# Patient Record
Sex: FEMALE | Race: WHITE | HISPANIC OR LATINO | Employment: FULL TIME | URBAN - METROPOLITAN AREA
[De-identification: names, ages, dates, MRNs, and addresses within clinical notes are randomized per-mention and may not be internally consistent; named-entity substitution may affect disease eponyms.]

---

## 2021-04-16 ENCOUNTER — PATIENT MESSAGE (OUTPATIENT)
Dept: RESEARCH | Facility: HOSPITAL | Age: 31
End: 2021-04-16

## 2022-01-10 ENCOUNTER — OFFICE VISIT (OUTPATIENT)
Dept: OBSTETRICS AND GYNECOLOGY | Facility: CLINIC | Age: 32
End: 2022-01-10

## 2022-01-10 VITALS
WEIGHT: 178.38 LBS | DIASTOLIC BLOOD PRESSURE: 70 MMHG | SYSTOLIC BLOOD PRESSURE: 114 MMHG | HEIGHT: 65 IN | BODY MASS INDEX: 29.72 KG/M2

## 2022-01-10 DIAGNOSIS — N89.8 VAGINAL DISCHARGE: ICD-10-CM

## 2022-01-10 DIAGNOSIS — Z23 NEED FOR HPV VACCINATION: ICD-10-CM

## 2022-01-10 DIAGNOSIS — Z01.419 WELL WOMAN EXAM WITH ROUTINE GYNECOLOGICAL EXAM: Primary | ICD-10-CM

## 2022-01-10 DIAGNOSIS — Z31.69 ENCOUNTER FOR PRECONCEPTION CONSULTATION: ICD-10-CM

## 2022-01-10 PROCEDURE — 88175 CYTOPATH C/V AUTO FLUID REDO: CPT | Performed by: OBSTETRICS & GYNECOLOGY

## 2022-01-10 PROCEDURE — 99213 OFFICE O/P EST LOW 20 MIN: CPT | Mod: PBBFAC | Performed by: OBSTETRICS & GYNECOLOGY

## 2022-01-10 PROCEDURE — 99385 PREV VISIT NEW AGE 18-39: CPT | Mod: S$PBB,,, | Performed by: OBSTETRICS & GYNECOLOGY

## 2022-01-10 PROCEDURE — 99999 PR PBB SHADOW E&M-EST. PATIENT-LVL III: CPT | Mod: PBBFAC,,, | Performed by: OBSTETRICS & GYNECOLOGY

## 2022-01-10 PROCEDURE — 87481 CANDIDA DNA AMP PROBE: CPT | Mod: 59 | Performed by: OBSTETRICS & GYNECOLOGY

## 2022-01-10 PROCEDURE — 87801 DETECT AGNT MULT DNA AMPLI: CPT | Performed by: OBSTETRICS & GYNECOLOGY

## 2022-01-10 PROCEDURE — 99385 PR PREVENTIVE VISIT,NEW,18-39: ICD-10-PCS | Mod: S$PBB,,, | Performed by: OBSTETRICS & GYNECOLOGY

## 2022-01-10 PROCEDURE — 99999 PR PBB SHADOW E&M-EST. PATIENT-LVL III: ICD-10-PCS | Mod: PBBFAC,,, | Performed by: OBSTETRICS & GYNECOLOGY

## 2022-01-10 PROCEDURE — 87624 HPV HI-RISK TYP POOLED RSLT: CPT | Performed by: OBSTETRICS & GYNECOLOGY

## 2022-01-10 RX ORDER — FLUCONAZOLE 150 MG/1
150 TABLET ORAL ONCE
Qty: 1 TABLET | Refills: 0 | Status: SHIPPED | OUTPATIENT
Start: 2022-01-10 | End: 2022-01-10

## 2022-01-10 RX ORDER — ETONOGESTREL AND ETHINYL ESTRADIOL VAGINAL RING .015; .12 MG/D; MG/D
1 RING VAGINAL
Qty: 3 EACH | Refills: 3 | Status: SHIPPED | OUTPATIENT
Start: 2022-01-10 | End: 2024-02-07

## 2022-01-10 RX ORDER — METRONIDAZOLE 7.5 MG/G
1 GEL VAGINAL NIGHTLY
Qty: 70 G | Refills: 1 | Status: SHIPPED | OUTPATIENT
Start: 2022-01-10 | End: 2024-02-07

## 2022-01-10 NOTE — PROGRESS NOTES
Subjective:       Patient ID: Radha Callaway is a 31 y.o. female.    Chief Complaint:  Annual Exam      History of Present Illness  HPI   31 y.o. female  here for annual.      She describes her periods as regular, normal flow.  denies break through bleeding.   denies vaginal itching or irritation.  denies vaginal discharge. History of recurrent BV.     She is sexually active.  She uses no method for contraception.    History of abnormal pap: No. Patient is unsure if she had HPV vaccine.   Last Pap: ~4 years ago - normal per patient  Last MMG: N/A  Last Colonoscopy: N/A   Last DXA: N/A    History reviewed. No pertinent family history.    GYN & OB History  Patient's last menstrual period was 2021.   Date of Last Pap: 2022    OB History    Para Term  AB Living   2       1     SAB IAB Ectopic Multiple Live Births   1              # Outcome Date GA Lbr Olman/2nd Weight Sex Delivery Anes PTL Lv   2             1 SAB                Review of Systems  Review of Systems   Constitutional: Negative for chills, diaphoresis, fatigue and fever.   Respiratory: Negative for cough and shortness of breath.    Cardiovascular: Negative for chest pain and palpitations.   Gastrointestinal: Negative for abdominal pain, constipation, diarrhea, nausea and vomiting.   Genitourinary: Negative for dyspareunia, pelvic pain, vaginal bleeding, vaginal discharge and vaginal pain.   Integumentary:  Negative for breast mass, nipple discharge and breast skin changes.   Neurological: Negative for headaches.   Psychiatric/Behavioral: Negative for depression.   Breast: Negative for mass, mastodynia, nipple discharge and skin changes          Objective:    Physical Exam:   Constitutional: She is oriented to person, place, and time. She appears well-developed and well-nourished. No distress.    HENT:   Head: Normocephalic and atraumatic.    Eyes: EOM are normal.    Neck: No thyromegaly present.     Pulmonary/Chest:  Effort normal. She exhibits no mass and no tenderness. Right breast exhibits no inverted nipple, no mass, no nipple discharge, no skin change, no tenderness and no swelling. Left breast exhibits no inverted nipple, no mass, no nipple discharge, no skin change, no tenderness and no swelling. Breasts are symmetrical.        Abdominal: Soft. She exhibits no distension and no mass. There is no abdominal tenderness. There is no rebound and no guarding. No hernia.     Genitourinary:    Genitourinary Comments: Normal external female genitalia; vagina rugated, normal; cervix normal, no masses; uterus small mobile nontender; no adnexal masses palpated; rectal deferred               Musculoskeletal: Normal range of motion and moves all extremeties.       Neurological: She is alert and oriented to person, place, and time.    Skin: Skin is warm. No rash noted.    Psychiatric: She has a normal mood and affect. Her behavior is normal. Judgment and thought content normal.          Assessment:        1. Well woman exam with routine gynecological exam    2. Need for HPV vaccination    3. Encounter for preconception consultation    4. Vaginal discharge             Plan:      Diagnoses and all orders for this visit:    Well woman exam with routine gynecological exam  - Pap guidelines discussed. Pap/HPV collected. HPV vaccine scheduled.  - Breast cancer screening not yet indicated.   - Colon cancer screening not yet indicated.   - Bone density screening not yet indicated.  - Contraception - Rx Nuvaring.  - STD screening - declined.  - Suspected BV - Rx metrogel.    Need for HPV vaccination  -     (In Office Administered) HPV Vaccine (9-Valent) (3 Dose) (IM)    Encounter for preconception consultation    Referral to [facility] for egg cryo consultation.    Orders Placed This Encounter   Procedures    HPV High Risk Genotypes, PCR    Vaginosis Screen by DNA Probe    (In Office Administered) HPV Vaccine (9-Valent) (3 Dose) (IM)       No  follow-ups on file.

## 2022-01-10 NOTE — PATIENT INSTRUCTIONS
Female Infertility:  1. Are you ovulating?  - the first day of your cycle is Day #1. You will likely ovulate around CD14.  - Buy ovulation predictor kits: Day 10-Day 16 (Ovulate on average Day 14). If you get a positive, have intercourse that day and the next.  - Come to the lab 7 days after the peak and have bloodwork (progesterone level). Let Dr. Ivey know when you get a peak and we can schedule the lab.  - Consider a test called AMH - can be done at [facility] or can be done at [facility] (they sometimes offer a special that includes AMH and SA for $100).  - TSH, prolactin   - FSH, estradiol at the beginning of your cycle, let Dr. Ivey know when you start your menstrual cycle.      2. Tubal Factor Infertility   1. When the fallopian tubes are blocked  2. Causes: endometriosis or history of chlamydia  3. Hysterosalpingogram ([facility] - $550)     3. Male factor infertility  - Semen analysis ($150)  - [facility] ([address] across from [facility])      4. Structural causes  - Fibroid, Uterine Septum  - Order an ultrasound    5. Unexplained Infertility    Take prenatal vitamin daily - folic acid 400 mcg daily - OTC. Nature Made.     Genetic screening  Hemoglobin electrophoresis - test for sickle cell trait and thalassemias  CF mutation   SMA mutation

## 2022-01-14 LAB
FINAL PATHOLOGIC DIAGNOSIS: NORMAL
Lab: NORMAL

## 2022-01-16 LAB
BACTERIAL VAGINOSIS DNA: NEGATIVE
CANDIDA GLABRATA DNA: NEGATIVE
CANDIDA KRUSEI DNA: NEGATIVE
CANDIDA RRNA VAG QL PROBE: POSITIVE
T VAGINALIS RRNA GENITAL QL PROBE: NEGATIVE

## 2022-01-18 RX ORDER — FLUCONAZOLE 150 MG/1
150 TABLET ORAL ONCE
Qty: 1 TABLET | Refills: 0 | Status: SHIPPED | OUTPATIENT
Start: 2022-01-18 | End: 2022-01-18

## 2022-01-19 LAB
HPV HR 12 DNA SPEC QL NAA+PROBE: NEGATIVE
HPV16 AG SPEC QL: NEGATIVE
HPV18 DNA SPEC QL NAA+PROBE: NEGATIVE

## 2024-01-29 ENCOUNTER — TELEPHONE (OUTPATIENT)
Dept: OBSTETRICS AND GYNECOLOGY | Facility: CLINIC | Age: 34
End: 2024-01-29

## 2024-01-29 NOTE — TELEPHONE ENCOUNTER
----- Message from Josselin Christian sent at 1/29/2024  1:44 PM CST -----  Regarding: self [phone]  Type: Patient Call Back    Who called: self     What is the request in detail:  pt was seen by a Dr. Doll who only delivers at  provider recommended Dr. Peterson.     Can the clinic reply by [facility]? No     Would the patient rather a call back or a response via [facility]? Call back     Best call back number: [phone]

## 2024-02-07 ENCOUNTER — ROUTINE PRENATAL (OUTPATIENT)
Dept: OBSTETRICS AND GYNECOLOGY | Facility: CLINIC | Age: 34
End: 2024-02-07
Payer: MEDICAID

## 2024-02-07 VITALS
BODY MASS INDEX: 31 KG/M2 | WEIGHT: 186.31 LBS | DIASTOLIC BLOOD PRESSURE: 75 MMHG | HEART RATE: 101 BPM | SYSTOLIC BLOOD PRESSURE: 134 MMHG

## 2024-02-07 DIAGNOSIS — O26.892 VAGINAL DISCHARGE DURING PREGNANCY IN SECOND TRIMESTER: ICD-10-CM

## 2024-02-07 DIAGNOSIS — Z3A.22 22 WEEKS GESTATION OF PREGNANCY: ICD-10-CM

## 2024-02-07 DIAGNOSIS — Z34.82 ENCOUNTER FOR SUPERVISION OF OTHER NORMAL PREGNANCY IN SECOND TRIMESTER: Primary | ICD-10-CM

## 2024-02-07 DIAGNOSIS — Z15.89 MTHFR GENE MUTATION: ICD-10-CM

## 2024-02-07 DIAGNOSIS — N89.8 VAGINAL DISCHARGE DURING PREGNANCY IN SECOND TRIMESTER: ICD-10-CM

## 2024-02-07 PROCEDURE — 81514 NFCT DS BV&VAGINITIS DNA ALG: CPT | Performed by: STUDENT IN AN ORGANIZED HEALTH CARE EDUCATION/TRAINING PROGRAM

## 2024-02-07 PROCEDURE — 99999 PR PBB SHADOW E&M-EST. PATIENT-LVL III: CPT | Mod: PBBFAC,,, | Performed by: STUDENT IN AN ORGANIZED HEALTH CARE EDUCATION/TRAINING PROGRAM

## 2024-02-07 PROCEDURE — 87491 CHLMYD TRACH DNA AMP PROBE: CPT | Performed by: STUDENT IN AN ORGANIZED HEALTH CARE EDUCATION/TRAINING PROGRAM

## 2024-02-07 PROCEDURE — 99213 OFFICE O/P EST LOW 20 MIN: CPT | Mod: PBBFAC,TH | Performed by: STUDENT IN AN ORGANIZED HEALTH CARE EDUCATION/TRAINING PROGRAM

## 2024-02-07 PROCEDURE — 87086 URINE CULTURE/COLONY COUNT: CPT | Performed by: STUDENT IN AN ORGANIZED HEALTH CARE EDUCATION/TRAINING PROGRAM

## 2024-02-07 PROCEDURE — 99214 OFFICE O/P EST MOD 30 MIN: CPT | Mod: TH,S$PBB,, | Performed by: STUDENT IN AN ORGANIZED HEALTH CARE EDUCATION/TRAINING PROGRAM

## 2024-02-07 RX ORDER — ALCOHOL 2.38 KG/3.79L
1 GEL TOPICAL DAILY
COMMUNITY
Start: 2023-12-07 | End: 2024-06-18

## 2024-02-07 RX ORDER — VITAMINS AND MINERALS 1; 1000; 100; 400; 30; 3; 3; 15; 20; 12; 7; 200; 29; 20 MG/1; [IU]/1; MG/1; [IU]/1; [IU]/1; MG/1; MG/1; MG/1; MG/1; UG/1; MG/1; MG/1; MG/1; MG/1
1 TABLET, CHEWABLE ORAL
COMMUNITY
Start: 2023-10-12

## 2024-02-07 RX ORDER — MICONAZOLE NITRATE 2 %
CREAM WITH APPLICATOR VAGINAL
COMMUNITY
Start: 2023-10-12 | End: 2024-06-05

## 2024-02-07 RX ORDER — FOLIC ACID 0.4 MG
1 TABLET ORAL DAILY
COMMUNITY
End: 2024-02-07

## 2024-02-07 RX ORDER — NAPROXEN SODIUM 220 MG/1
81 TABLET, FILM COATED ORAL DAILY
Status: ON HOLD | COMMUNITY
End: 2024-06-14 | Stop reason: HOSPADM

## 2024-02-09 LAB
BACTERIA UR CULT: NORMAL
BACTERIAL VAGINOSIS DNA: NEGATIVE
CANDIDA GLABRATA DNA: NEGATIVE
CANDIDA KRUSEI DNA: NEGATIVE
CANDIDA RRNA VAG QL PROBE: NEGATIVE
T VAGINALIS RRNA GENITAL QL PROBE: NEGATIVE

## 2024-02-15 LAB
C TRACH DNA SPEC QL NAA+PROBE: NOT DETECTED
N GONORRHOEA DNA SPEC QL NAA+PROBE: NOT DETECTED

## 2024-02-21 ENCOUNTER — PATIENT MESSAGE (OUTPATIENT)
Dept: OTHER | Facility: OTHER | Age: 34
End: 2024-02-21

## 2024-03-04 ENCOUNTER — TELEPHONE (OUTPATIENT)
Dept: OBSTETRICS AND GYNECOLOGY | Facility: CLINIC | Age: 34
End: 2024-03-04

## 2024-03-04 PROBLEM — Z15.89 MTHFR GENE MUTATION: Status: ACTIVE | Noted: 2024-03-04

## 2024-03-04 NOTE — TELEPHONE ENCOUNTER
Called to check in. Confirmed identity. Pt very anxious that she recognized initial OB labs were never performed at prior OB office. We discussed we can add these on to her 1 hr GTT blood draw at visit on Monday. She feels much better knowing this plan. No further questions.

## 2024-03-05 NOTE — PROGRESS NOTES
22w0d by 13w6d US. Initial OB to me, transfer from [facility].   This pregnancy: H/o subchorionic hematoma 1st tri, no bleeding since. NIPT negative. Seeing MFM for renal pyelectasis, otherwise normal anatomy.    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   PMH: h/o exercise induced asthma, h/o depression, +MTFR gene. Meds: folate, PNV.    Otherwise doing well today with no complaints. Recently treated for yeast infection and requests vaginal swab to ensure it has cleared.   +FM. Denies abdominal pain, vaginal discharge, vaginal bleeding, dysuria, headaches.  1 hr GTT/CBC next visit. Reports she has f/u with Dr Wesley BABIN at [facility].   Pain, bleeding, fever precautions provided. RTC 4 wks or sooner if needed.      I spent a total of 30 minutes on the day of the visit. This includes face to face time and non-face to face time preparing to see the patient (eg, review of tests with interpretation of results and explanation of results to patient), obtaining and/or reviewing separately obtained history, documenting clinical information, appropriate prenatal counseling, and care coordination.

## 2024-03-06 ENCOUNTER — PATIENT MESSAGE (OUTPATIENT)
Dept: OTHER | Facility: OTHER | Age: 34
End: 2024-03-06

## 2024-03-12 ENCOUNTER — ROUTINE PRENATAL (OUTPATIENT)
Dept: OBSTETRICS AND GYNECOLOGY | Facility: CLINIC | Age: 34
End: 2024-03-12
Payer: MEDICAID

## 2024-03-12 ENCOUNTER — LAB VISIT (OUTPATIENT)
Dept: LAB | Facility: HOSPITAL | Age: 34
End: 2024-03-12
Attending: STUDENT IN AN ORGANIZED HEALTH CARE EDUCATION/TRAINING PROGRAM
Payer: MEDICAID

## 2024-03-12 VITALS — BODY MASS INDEX: 32.5 KG/M2 | WEIGHT: 195.31 LBS | SYSTOLIC BLOOD PRESSURE: 118 MMHG | DIASTOLIC BLOOD PRESSURE: 80 MMHG

## 2024-03-12 DIAGNOSIS — Z3A.22 22 WEEKS GESTATION OF PREGNANCY: ICD-10-CM

## 2024-03-12 DIAGNOSIS — Z34.02 ENCOUNTER FOR SUPERVISION OF NORMAL FIRST PREGNANCY IN SECOND TRIMESTER: Primary | ICD-10-CM

## 2024-03-12 DIAGNOSIS — Z3A.26 26 WEEKS GESTATION OF PREGNANCY: ICD-10-CM

## 2024-03-12 LAB
ABO + RH BLD: NORMAL
ALBUMIN SERPL BCP-MCNC: 2.5 G/DL (ref 3.5–5.2)
ALP SERPL-CCNC: 90 U/L (ref 55–135)
ALT SERPL W/O P-5'-P-CCNC: 23 U/L (ref 10–44)
ANION GAP SERPL CALC-SCNC: 6 MMOL/L (ref 8–16)
AST SERPL-CCNC: 16 U/L (ref 10–40)
BASOPHILS # BLD AUTO: 0.03 K/UL (ref 0–0.2)
BASOPHILS NFR BLD: 0.3 % (ref 0–1.9)
BILIRUB SERPL-MCNC: 0.1 MG/DL (ref 0.1–1)
BLD GP AB SCN CELLS X3 SERPL QL: NORMAL
BUN SERPL-MCNC: 9 MG/DL (ref 6–20)
CALCIUM SERPL-MCNC: 8.6 MG/DL (ref 8.7–10.5)
CHLORIDE SERPL-SCNC: 107 MMOL/L (ref 95–110)
CO2 SERPL-SCNC: 22 MMOL/L (ref 23–29)
CREAT SERPL-MCNC: 0.6 MG/DL (ref 0.5–1.4)
DIFFERENTIAL METHOD BLD: ABNORMAL
EOSINOPHIL # BLD AUTO: 0.1 K/UL (ref 0–0.5)
EOSINOPHIL NFR BLD: 0.7 % (ref 0–8)
ERYTHROCYTE [DISTWIDTH] IN BLOOD BY AUTOMATED COUNT: 13.5 % (ref 11.5–14.5)
EST. GFR  (NO RACE VARIABLE): >60 ML/MIN/1.73 M^2
GLUCOSE SERPL-MCNC: 81 MG/DL (ref 70–110)
GLUCOSE SERPL-MCNC: 84 MG/DL (ref 70–140)
HBV SURFACE AG SERPL QL IA: NORMAL
HCT VFR BLD AUTO: 35.6 % (ref 37–48.5)
HGB BLD-MCNC: 11.8 G/DL (ref 12–16)
HIV 1+2 AB+HIV1 P24 AG SERPL QL IA: NORMAL
IMM GRANULOCYTES # BLD AUTO: 0.07 K/UL (ref 0–0.04)
IMM GRANULOCYTES NFR BLD AUTO: 0.7 % (ref 0–0.5)
LYMPHOCYTES # BLD AUTO: 1.4 K/UL (ref 1–4.8)
LYMPHOCYTES NFR BLD: 15.1 % (ref 18–48)
MCH RBC QN AUTO: 31.3 PG (ref 27–31)
MCHC RBC AUTO-ENTMCNC: 33.1 G/DL (ref 32–36)
MCV RBC AUTO: 94 FL (ref 82–98)
MONOCYTES # BLD AUTO: 1 K/UL (ref 0.3–1)
MONOCYTES NFR BLD: 10.3 % (ref 4–15)
NEUTROPHILS # BLD AUTO: 6.9 K/UL (ref 1.8–7.7)
NEUTROPHILS NFR BLD: 72.9 % (ref 38–73)
NRBC BLD-RTO: 0 /100 WBC
PLATELET # BLD AUTO: 232 K/UL (ref 150–450)
PMV BLD AUTO: 11.8 FL (ref 9.2–12.9)
POTASSIUM SERPL-SCNC: 3.6 MMOL/L (ref 3.5–5.1)
PROT SERPL-MCNC: 5.9 G/DL (ref 6–8.4)
RBC # BLD AUTO: 3.77 M/UL (ref 4–5.4)
SODIUM SERPL-SCNC: 135 MMOL/L (ref 136–145)
SPECIMEN OUTDATE: NORMAL
WBC # BLD AUTO: 9.53 K/UL (ref 3.9–12.7)

## 2024-03-12 PROCEDURE — 86592 SYPHILIS TEST NON-TREP QUAL: CPT | Performed by: STUDENT IN AN ORGANIZED HEALTH CARE EDUCATION/TRAINING PROGRAM

## 2024-03-12 PROCEDURE — 87389 HIV-1 AG W/HIV-1&-2 AB AG IA: CPT | Performed by: STUDENT IN AN ORGANIZED HEALTH CARE EDUCATION/TRAINING PROGRAM

## 2024-03-12 PROCEDURE — 80053 COMPREHEN METABOLIC PANEL: CPT | Performed by: STUDENT IN AN ORGANIZED HEALTH CARE EDUCATION/TRAINING PROGRAM

## 2024-03-12 PROCEDURE — 86901 BLOOD TYPING SEROLOGIC RH(D): CPT | Performed by: STUDENT IN AN ORGANIZED HEALTH CARE EDUCATION/TRAINING PROGRAM

## 2024-03-12 PROCEDURE — 87340 HEPATITIS B SURFACE AG IA: CPT | Performed by: STUDENT IN AN ORGANIZED HEALTH CARE EDUCATION/TRAINING PROGRAM

## 2024-03-12 PROCEDURE — 99213 OFFICE O/P EST LOW 20 MIN: CPT | Mod: TH,S$PBB,, | Performed by: STUDENT IN AN ORGANIZED HEALTH CARE EDUCATION/TRAINING PROGRAM

## 2024-03-12 PROCEDURE — 99212 OFFICE O/P EST SF 10 MIN: CPT | Mod: PBBFAC,25,TH | Performed by: STUDENT IN AN ORGANIZED HEALTH CARE EDUCATION/TRAINING PROGRAM

## 2024-03-12 PROCEDURE — 82950 GLUCOSE TEST: CPT | Performed by: STUDENT IN AN ORGANIZED HEALTH CARE EDUCATION/TRAINING PROGRAM

## 2024-03-12 PROCEDURE — 85025 COMPLETE CBC W/AUTO DIFF WBC: CPT | Performed by: STUDENT IN AN ORGANIZED HEALTH CARE EDUCATION/TRAINING PROGRAM

## 2024-03-12 PROCEDURE — 36415 COLL VENOUS BLD VENIPUNCTURE: CPT | Performed by: STUDENT IN AN ORGANIZED HEALTH CARE EDUCATION/TRAINING PROGRAM

## 2024-03-12 PROCEDURE — 99999 PR PBB SHADOW E&M-EST. PATIENT-LVL II: CPT | Mod: PBBFAC,,, | Performed by: STUDENT IN AN ORGANIZED HEALTH CARE EDUCATION/TRAINING PROGRAM

## 2024-03-12 NOTE — LETTER
March 12, 2024    Radha Callaway  [address] [place] [id number]         [facility]  [address] [facility] [address]  Phone: [phone] March 12, 2024     Patient: Radha Callaway   YOB: 1990   Date of Visit: 3/12/2024       To Whom It May Concern:    Radha Callaway is under the care of Dr.Lillian Peterson with [facility] for the duration of her pregnancy. Her estimated due date is June 12th, 2024 and is not recommended that she travel the month prior to delivery. Chrissy Callaway and Jesika Vitor are family members of this patient.     If you have any questions or concerns, please don't hesitate to call.    Sincerely,        Marissa Peterson MD

## 2024-03-13 LAB — RPR SER QL: NORMAL

## 2024-03-14 ENCOUNTER — TELEPHONE (OUTPATIENT)
Dept: OBSTETRICS AND GYNECOLOGY | Facility: CLINIC | Age: 34
End: 2024-03-14

## 2024-03-14 NOTE — TELEPHONE ENCOUNTER
Called to answer questions. She reports cough and wondering safe medications - recommend mucinex and claritin. Other questions answered, as well. Also informed of normal labs results. She voiced understanding.

## 2024-03-20 ENCOUNTER — PATIENT MESSAGE (OUTPATIENT)
Dept: OTHER | Facility: OTHER | Age: 34
End: 2024-03-20

## 2024-04-02 ENCOUNTER — TELEPHONE (OUTPATIENT)
Dept: OBSTETRICS AND GYNECOLOGY | Facility: CLINIC | Age: 34
End: 2024-04-02
Payer: MEDICAID

## 2024-04-02 NOTE — TELEPHONE ENCOUNTER
Nicholas pt states she has been suffering with congestion for about 3wks now pt is 29wks ob pt states she has taken OCT meds and mist nothing is helping pt would like a call to discuss     4/2/24 @ 1501 pt states she has bad congestion for over a week, taking medication using a nasal spray.   Pt instructed to follow up with [facility] or go the urgent care. Hydration and rest. Ok to continue sudafed and nasal spray. Pt verbalizes understanding.

## 2024-04-03 ENCOUNTER — PATIENT MESSAGE (OUTPATIENT)
Dept: OTHER | Facility: OTHER | Age: 34
End: 2024-04-03
Payer: MEDICAID

## 2024-04-09 ENCOUNTER — TELEPHONE (OUTPATIENT)
Dept: OBSTETRICS AND GYNECOLOGY | Facility: CLINIC | Age: 34
End: 2024-04-09
Payer: MEDICAID

## 2024-04-09 ENCOUNTER — TELEPHONE (OUTPATIENT)
Dept: OBSTETRICS AND GYNECOLOGY | Facility: CLINIC | Age: 34
End: 2024-04-09

## 2024-04-09 NOTE — LETTER
April 9, 2024    Radha Callaway  [address] [place] [id number]              [facility]  [address] [facility] [address]  Phone: [phone]    To Whom It May Concern:    Case # [id number]     Ms. Radha Callaway is currently under our care for pregnancy. Her estimated date of delivery is June 12th, 2024.    Sincerely,    Marissa Peterson MD

## 2024-04-09 NOTE — TELEPHONE ENCOUNTER
Returning pt call regarding confirmation letter and MD notes. Pt stating that information needs to be sent to insurance because they are saying she has not been seen since March. Informing pt that letter is sent to portal, will send MD notes. Pt voices understanding. E-mail confirmed. No further questions/ concerns.

## 2024-04-09 NOTE — PROGRESS NOTES
26w6d  Doing well today with no complaints.  Excellent FM. Denies vaginal bleeding, discharge, contractions, LOF.  1 hr GTT/CBC today. Will add-on HepCAb which was not collected w/initial labs. Tdap next visit.   Has f/u at [facility]  for renal pyelectasis.  Labor, preE, FMC precautions provided. RTC 4 wk or earlier if needed.    I spent a total of 20 minutes on the day of the visit. This includes face to face time and non-face to face time preparing to see the patient (eg, review of tests with interpretation of results and explanation of results to patient), obtaining and/or reviewing separately obtained history, documenting clinical information, appropriate prenatal counseling, and care coordination.

## 2024-04-17 ENCOUNTER — PATIENT MESSAGE (OUTPATIENT)
Dept: OTHER | Facility: OTHER | Age: 34
End: 2024-04-17
Payer: MEDICAID

## 2024-04-18 ENCOUNTER — ROUTINE PRENATAL (OUTPATIENT)
Dept: OBSTETRICS AND GYNECOLOGY | Facility: CLINIC | Age: 34
End: 2024-04-18
Attending: STUDENT IN AN ORGANIZED HEALTH CARE EDUCATION/TRAINING PROGRAM
Payer: MEDICAID

## 2024-04-18 VITALS
SYSTOLIC BLOOD PRESSURE: 118 MMHG | DIASTOLIC BLOOD PRESSURE: 68 MMHG | BODY MASS INDEX: 33.73 KG/M2 | WEIGHT: 202.69 LBS

## 2024-04-18 DIAGNOSIS — Z3A.32 32 WEEKS GESTATION OF PREGNANCY: Primary | ICD-10-CM

## 2024-04-18 PROCEDURE — 99212 OFFICE O/P EST SF 10 MIN: CPT | Mod: PBBFAC | Performed by: NURSE PRACTITIONER

## 2024-04-18 PROCEDURE — 99213 OFFICE O/P EST LOW 20 MIN: CPT | Mod: TH,S$PBB,, | Performed by: NURSE PRACTITIONER

## 2024-04-18 PROCEDURE — 99999 PR PBB SHADOW E&M-EST. PATIENT-LVL II: CPT | Mod: PBBFAC,,, | Performed by: NURSE PRACTITIONER

## 2024-04-18 RX ORDER — B-COMPLEX WITH VITAMIN C
1 TABLET ORAL DAILY
COMMUNITY

## 2024-04-18 NOTE — PROGRESS NOTES
Patient with no complaints. Denies vaginal bleeding or contractions. Good FM. RTC in 2 weeks.     Vitals signs, FHTs, urine dip, and PE findings documented, reviewed and available in OB flow chart.       I spent a total of 20 minutes on the day of the visit.This includes face to face time and non-face to face time preparing to see the patient (eg, review of tests), Obtaining and/or reviewing separately obtained history, Documenting clinical information in the electronic or other health record, Independently interpreting resultsand communicating results to the patient/family/caregiver, or Care coordination.     Coffective counseling sheet Build Your Team discussed with mother. Reinforced importance of early identification of support team including champion, OB provider, pediatrician and local community resources. Encouraged mother to download Coffective mobile raffy if she has not already done so.  Mother verbalizes understanding. Also discussed quiet time and delayed bathing.

## 2024-04-29 ENCOUNTER — TELEPHONE (OUTPATIENT)
Dept: OBSTETRICS AND GYNECOLOGY | Facility: CLINIC | Age: 34
End: 2024-04-29
Payer: MEDICAID

## 2024-04-29 NOTE — TELEPHONE ENCOUNTER
Dr Peterson pt calling ob 33wks wants to know if she go on a trip two hours away on May 17-19 and due on 6-12, would like to discuss. Pt # [phone]     4/29/24 @ 8973 .Returned pt's call. N/A LMTRC @ [phone]. Will also send portal message.

## 2024-05-01 ENCOUNTER — ROUTINE PRENATAL (OUTPATIENT)
Dept: OBSTETRICS AND GYNECOLOGY | Facility: CLINIC | Age: 34
End: 2024-05-01
Payer: MEDICAID

## 2024-05-01 VITALS
DIASTOLIC BLOOD PRESSURE: 76 MMHG | BODY MASS INDEX: 34.05 KG/M2 | WEIGHT: 204.56 LBS | SYSTOLIC BLOOD PRESSURE: 129 MMHG

## 2024-05-01 DIAGNOSIS — N96 HISTORY OF MULTIPLE MISCARRIAGES: ICD-10-CM

## 2024-05-01 DIAGNOSIS — Z3A.34 34 WEEKS GESTATION OF PREGNANCY: ICD-10-CM

## 2024-05-01 DIAGNOSIS — Z34.03 ENCOUNTER FOR SUPERVISION OF NORMAL FIRST PREGNANCY, THIRD TRIMESTER: Primary | ICD-10-CM

## 2024-05-01 PROCEDURE — 99999 PR PBB SHADOW E&M-EST. PATIENT-LVL III: CPT | Mod: PBBFAC,,, | Performed by: STUDENT IN AN ORGANIZED HEALTH CARE EDUCATION/TRAINING PROGRAM

## 2024-05-01 PROCEDURE — 99213 OFFICE O/P EST LOW 20 MIN: CPT | Mod: TH,S$PBB,, | Performed by: STUDENT IN AN ORGANIZED HEALTH CARE EDUCATION/TRAINING PROGRAM

## 2024-05-01 PROCEDURE — 99213 OFFICE O/P EST LOW 20 MIN: CPT | Mod: PBBFAC,TH | Performed by: STUDENT IN AN ORGANIZED HEALTH CARE EDUCATION/TRAINING PROGRAM

## 2024-05-01 RX ORDER — TERCONAZOLE 4 MG/G
CREAM VAGINAL
COMMUNITY
Start: 2024-04-25 | End: 2024-05-02

## 2024-05-01 RX ORDER — FOLIC ACID 0.4 MG
2 TABLET ORAL DAILY
COMMUNITY
Start: 2024-04-25 | End: 2024-05-25

## 2024-05-01 NOTE — LETTER
May 1, 2024    Radha Callaway  [address]  [place] [address]              [facility]  [address] [facility]  [place] [address]  Phone: [phone]    To Whom It May Concern:    Ms. Radha Callaway was seen in our office on Wednesday, May 1st, 2024. She is currently under our care for pregnancy. Her estimated delivery date is June 12th, 2024.    It is my medical opinion that she avoid travel a month prior to delivery.     Please let me know if you have any questions, concerns, or if I can be of further assistance to you.    Sincerely,    Marissa Peterson MD

## 2024-05-01 NOTE — PROGRESS NOTES
34w0d  Doing well today with no complaints. Pain in her joints, worse in her hands, has tried carpal tunnel braces with not much improvement.  Excellent FM. Denies vaginal bleeding, discharge, contractions, LOF.  Reviewed [facility] MFM note - recommends not to go past 40 wks and renal pyelectasis resolved. Pt anxious about an induction if spontaneous labor does not occur, answered questions today, will continue to discuss each visit.  Tdap in pharmacy today. Consents/GBS/labs next visit.   Labor, preE, FMC precautions provided. RTC 2 wk or earlier if needed.    I spent a total of 20 minutes on the day of the visit. This includes face to face time and non-face to face time preparing to see the patient (eg, review of tests with interpretation of results and explanation of results to patient), obtaining and/or reviewing separately obtained history, documenting clinical information, appropriate prenatal counseling, and care coordination.

## 2024-05-08 ENCOUNTER — TELEPHONE (OUTPATIENT)
Dept: OBSTETRICS AND GYNECOLOGY | Facility: CLINIC | Age: 34
End: 2024-05-08
Payer: MEDICAID

## 2024-05-08 ENCOUNTER — PATIENT MESSAGE (OUTPATIENT)
Dept: OTHER | Facility: OTHER | Age: 34
End: 2024-05-08
Payer: MEDICAID

## 2024-05-08 NOTE — TELEPHONE ENCOUNTER
Pt called in give update on Urgent care visit, pt is negative for covid, flu and strep. Pt was giving medications but was told to contact Md to see if she can take them,   1. Alahist P.E 2.75ml 3xs a day   2. Zpack 250mg   3.Tessalon 200mg 3x a day   4. Prednisone 20mg tab for 3 days   5.phenergen DM 6.25-15mg/5ml     Pt states she went to the urgent  on sudafed and mucinex  Will let Dr Peterson know  Pt KASIA       Spoke with Dr Nicholas garcia to take meds with Tessalon use sparingly. Replace sudafed and mucines with alahist. Pt KASIA

## 2024-05-08 NOTE — TELEPHONE ENCOUNTER
Nicholas pt calling, ob 35wks,  states she is sick. Would like to discuss coming in or going to urgent care #[phone]    5/8/24 @ 1139 Woke up with very sore throat, Mucus, Pt instructed to go to the urgent care for evaluation, swabs etc. Will send the approved list of medications over the counter to treat symptoms, hydrate, rest and let us know what they say in the urgent.care. Pt KASIA

## 2024-05-09 ENCOUNTER — PATIENT MESSAGE (OUTPATIENT)
Dept: OBSTETRICS AND GYNECOLOGY | Facility: CLINIC | Age: 34
End: 2024-05-09
Payer: MEDICAID

## 2024-05-15 ENCOUNTER — LAB VISIT (OUTPATIENT)
Dept: LAB | Facility: HOSPITAL | Age: 34
End: 2024-05-15
Attending: STUDENT IN AN ORGANIZED HEALTH CARE EDUCATION/TRAINING PROGRAM
Payer: MEDICAID

## 2024-05-15 ENCOUNTER — ROUTINE PRENATAL (OUTPATIENT)
Dept: OBSTETRICS AND GYNECOLOGY | Facility: CLINIC | Age: 34
End: 2024-05-15
Payer: MEDICAID

## 2024-05-15 VITALS
WEIGHT: 211.88 LBS | BODY MASS INDEX: 35.26 KG/M2 | DIASTOLIC BLOOD PRESSURE: 83 MMHG | SYSTOLIC BLOOD PRESSURE: 126 MMHG

## 2024-05-15 DIAGNOSIS — N96 HISTORY OF MULTIPLE MISCARRIAGES: ICD-10-CM

## 2024-05-15 DIAGNOSIS — Z3A.36 36 WEEKS GESTATION OF PREGNANCY: ICD-10-CM

## 2024-05-15 DIAGNOSIS — Z34.03 ENCOUNTER FOR SUPERVISION OF NORMAL FIRST PREGNANCY, THIRD TRIMESTER: Primary | ICD-10-CM

## 2024-05-15 LAB
BASOPHILS # BLD AUTO: 0.02 K/UL (ref 0–0.2)
BASOPHILS NFR BLD: 0.2 % (ref 0–1.9)
DIFFERENTIAL METHOD BLD: ABNORMAL
EOSINOPHIL # BLD AUTO: 0.1 K/UL (ref 0–0.5)
EOSINOPHIL NFR BLD: 0.8 % (ref 0–8)
ERYTHROCYTE [DISTWIDTH] IN BLOOD BY AUTOMATED COUNT: 13.3 % (ref 11.5–14.5)
HCT VFR BLD AUTO: 36.7 % (ref 37–48.5)
HGB BLD-MCNC: 12.3 G/DL (ref 12–16)
HIV 1+2 AB+HIV1 P24 AG SERPL QL IA: NORMAL
IMM GRANULOCYTES # BLD AUTO: 0.07 K/UL (ref 0–0.04)
IMM GRANULOCYTES NFR BLD AUTO: 0.7 % (ref 0–0.5)
LYMPHOCYTES # BLD AUTO: 1.8 K/UL (ref 1–4.8)
LYMPHOCYTES NFR BLD: 18 % (ref 18–48)
MCH RBC QN AUTO: 31.5 PG (ref 27–31)
MCHC RBC AUTO-ENTMCNC: 33.5 G/DL (ref 32–36)
MCV RBC AUTO: 94 FL (ref 82–98)
MONOCYTES # BLD AUTO: 0.8 K/UL (ref 0.3–1)
MONOCYTES NFR BLD: 7.6 % (ref 4–15)
NEUTROPHILS # BLD AUTO: 7.3 K/UL (ref 1.8–7.7)
NEUTROPHILS NFR BLD: 72.7 % (ref 38–73)
NRBC BLD-RTO: 0 /100 WBC
PLATELET # BLD AUTO: 318 K/UL (ref 150–450)
PMV BLD AUTO: 12.1 FL (ref 9.2–12.9)
RBC # BLD AUTO: 3.9 M/UL (ref 4–5.4)
TREPONEMA PALLIDUM IGG+IGM AB [PRESENCE] IN SERUM OR PLASMA BY IMMUNOASSAY: NONREACTIVE
WBC # BLD AUTO: 10.08 K/UL (ref 3.9–12.7)

## 2024-05-15 PROCEDURE — 99212 OFFICE O/P EST SF 10 MIN: CPT | Mod: PBBFAC,TH | Performed by: STUDENT IN AN ORGANIZED HEALTH CARE EDUCATION/TRAINING PROGRAM

## 2024-05-15 PROCEDURE — 85025 COMPLETE CBC W/AUTO DIFF WBC: CPT | Performed by: STUDENT IN AN ORGANIZED HEALTH CARE EDUCATION/TRAINING PROGRAM

## 2024-05-15 PROCEDURE — 99213 OFFICE O/P EST LOW 20 MIN: CPT | Mod: TH,S$PBB,, | Performed by: STUDENT IN AN ORGANIZED HEALTH CARE EDUCATION/TRAINING PROGRAM

## 2024-05-15 PROCEDURE — 99999 PR PBB SHADOW E&M-EST. PATIENT-LVL II: CPT | Mod: PBBFAC,,, | Performed by: STUDENT IN AN ORGANIZED HEALTH CARE EDUCATION/TRAINING PROGRAM

## 2024-05-15 PROCEDURE — 87389 HIV-1 AG W/HIV-1&-2 AB AG IA: CPT | Performed by: STUDENT IN AN ORGANIZED HEALTH CARE EDUCATION/TRAINING PROGRAM

## 2024-05-15 PROCEDURE — 86593 SYPHILIS TEST NON-TREP QUANT: CPT | Performed by: STUDENT IN AN ORGANIZED HEALTH CARE EDUCATION/TRAINING PROGRAM

## 2024-05-15 PROCEDURE — 87081 CULTURE SCREEN ONLY: CPT | Performed by: STUDENT IN AN ORGANIZED HEALTH CARE EDUCATION/TRAINING PROGRAM

## 2024-05-15 PROCEDURE — 36415 COLL VENOUS BLD VENIPUNCTURE: CPT | Performed by: STUDENT IN AN ORGANIZED HEALTH CARE EDUCATION/TRAINING PROGRAM

## 2024-05-15 NOTE — PROGRESS NOTES
36w0d  Doing well today with no complaints.  Excellent FM. Denies vaginal bleeding, discharge, contractions, LOF.  R/B/A of vaginal, operative, and  delivery as well as transfusion of blood products discussed today. All questions answered and patient voices understanding. Consents signed.   GBS, HIV, TrepIg, CBC collected.   Looking for a pediatrician, thinking about POP vs Nexplanon for PPC.  Labor, preE, FMC precautions provided. RTC 1 wk or earlier if needed.    I spent a total of 20 minutes on the day of the visit. This includes face to face time and non-face to face time preparing to see the patient (eg, review of tests with interpretation of results and explanation of results to patient), obtaining and/or reviewing separately obtained history, documenting clinical information, appropriate prenatal counseling, and care coordination.

## 2024-05-17 LAB — BACTERIA SPEC AEROBE CULT: NORMAL

## 2024-05-22 ENCOUNTER — ROUTINE PRENATAL (OUTPATIENT)
Dept: OBSTETRICS AND GYNECOLOGY | Facility: CLINIC | Age: 34
End: 2024-05-22
Payer: MEDICAID

## 2024-05-22 ENCOUNTER — TELEPHONE (OUTPATIENT)
Dept: OBSTETRICS AND GYNECOLOGY | Facility: HOSPITAL | Age: 34
End: 2024-05-22
Payer: MEDICAID

## 2024-05-22 VITALS
WEIGHT: 211.63 LBS | DIASTOLIC BLOOD PRESSURE: 82 MMHG | BODY MASS INDEX: 35.22 KG/M2 | SYSTOLIC BLOOD PRESSURE: 122 MMHG

## 2024-05-22 DIAGNOSIS — N96 HISTORY OF MULTIPLE MISCARRIAGES: ICD-10-CM

## 2024-05-22 DIAGNOSIS — Z15.89 MTHFR GENE MUTATION: Primary | ICD-10-CM

## 2024-05-22 DIAGNOSIS — N89.8 VAGINAL DISCHARGE: ICD-10-CM

## 2024-05-22 DIAGNOSIS — Z34.03 ENCOUNTER FOR SUPERVISION OF NORMAL FIRST PREGNANCY, THIRD TRIMESTER: Primary | ICD-10-CM

## 2024-05-22 DIAGNOSIS — Z3A.37 37 WEEKS GESTATION OF PREGNANCY: ICD-10-CM

## 2024-05-22 PROCEDURE — 99999 PR PBB SHADOW E&M-EST. PATIENT-LVL III: CPT | Mod: PBBFAC,,, | Performed by: STUDENT IN AN ORGANIZED HEALTH CARE EDUCATION/TRAINING PROGRAM

## 2024-05-22 PROCEDURE — 99213 OFFICE O/P EST LOW 20 MIN: CPT | Mod: TH,S$PBB,, | Performed by: STUDENT IN AN ORGANIZED HEALTH CARE EDUCATION/TRAINING PROGRAM

## 2024-05-22 PROCEDURE — 99213 OFFICE O/P EST LOW 20 MIN: CPT | Mod: PBBFAC,TH | Performed by: STUDENT IN AN ORGANIZED HEALTH CARE EDUCATION/TRAINING PROGRAM

## 2024-05-22 PROCEDURE — 81514 NFCT DS BV&VAGINITIS DNA ALG: CPT | Performed by: STUDENT IN AN ORGANIZED HEALTH CARE EDUCATION/TRAINING PROGRAM

## 2024-05-22 NOTE — TELEPHONE ENCOUNTER
GILLIAN:  PLEASE SEND DATE AND TIME TO [facility] TO PUT ON [employer] AND EPIC AND TO [facility] TO PUT ON OB LIST  DON'T FORGET TO CALL PT AND LET HER KNOW ALSO#####          Procedure: answer Y where needed       Induction     Pitocin_____     Cytotec_x___     Balloon____     Other______         Primary____      Repeat____    BTL _____________    Cerclage _________       Indication (elective/other) elective at 40w1d, +MTFR gene, h/o exercise induced asthma, depression, recurrent SAB, renal pyelectasis resolved, transfer of care in 2nd trimetser    Date desired  24  Time desired  0000    Due Date 24    Cervical exam- (inductions only)   Dilation:   Effacement:   Station:   Consistency:   Position:      HENSON SCORE____________

## 2024-05-22 NOTE — PROGRESS NOTES
"37w0d  Doing well today overall. C/o "veiny" appearance in bilateral feet, no swelling. Also continuing to have congestion, she has had this on and off throughout pregnancy, current on no meds, recommend daily Claritin and saline nasal rinse. Also notes vaginal discharge/odor, mucus like discharge on exam, affirm collected. No other complaints.  Excellent FM. Denies vaginal bleeding, contractions, LOF.  Discussed IOL, desires 6/13 at midnight, will place request.  Labor, preE, FMC precautions provided. RTC 1 wk or earlier if needed.    I spent a total of 20 minutes on the day of the visit. This includes face to face time and non-face to face time preparing to see the patient (eg, review of tests with interpretation of results and explanation of results to patient), obtaining and/or reviewing separately obtained history, documenting clinical information, appropriate prenatal counseling, and care coordination.    "

## 2024-05-23 LAB
BACTERIAL VAGINOSIS DNA: POSITIVE
CANDIDA GLABRATA DNA: NEGATIVE
CANDIDA KRUSEI DNA: NEGATIVE
CANDIDA RRNA VAG QL PROBE: POSITIVE
T VAGINALIS RRNA GENITAL QL PROBE: NEGATIVE

## 2024-05-24 DIAGNOSIS — B37.31 YEAST VAGINITIS: Primary | ICD-10-CM

## 2024-05-24 DIAGNOSIS — N76.0 BACTERIAL VAGINOSIS: ICD-10-CM

## 2024-05-24 DIAGNOSIS — B96.89 BACTERIAL VAGINOSIS: ICD-10-CM

## 2024-05-24 RX ORDER — METRONIDAZOLE 7.5 MG/G
1 GEL VAGINAL NIGHTLY
Qty: 70 G | Refills: 0 | Status: SHIPPED | OUTPATIENT
Start: 2024-05-24 | End: 2024-05-29

## 2024-05-29 ENCOUNTER — ROUTINE PRENATAL (OUTPATIENT)
Dept: OBSTETRICS AND GYNECOLOGY | Facility: CLINIC | Age: 34
End: 2024-05-29
Payer: MEDICAID

## 2024-05-29 VITALS
WEIGHT: 214.31 LBS | DIASTOLIC BLOOD PRESSURE: 86 MMHG | BODY MASS INDEX: 35.66 KG/M2 | SYSTOLIC BLOOD PRESSURE: 118 MMHG

## 2024-05-29 DIAGNOSIS — Z15.89 MTHFR GENE MUTATION: ICD-10-CM

## 2024-05-29 DIAGNOSIS — N96 HISTORY OF MULTIPLE MISCARRIAGES: ICD-10-CM

## 2024-05-29 DIAGNOSIS — Z34.03 ENCOUNTER FOR SUPERVISION OF NORMAL FIRST PREGNANCY, THIRD TRIMESTER: Primary | ICD-10-CM

## 2024-05-29 DIAGNOSIS — Z3A.38 38 WEEKS GESTATION OF PREGNANCY: ICD-10-CM

## 2024-05-29 PROCEDURE — 99213 OFFICE O/P EST LOW 20 MIN: CPT | Mod: TH,S$PBB,, | Performed by: STUDENT IN AN ORGANIZED HEALTH CARE EDUCATION/TRAINING PROGRAM

## 2024-05-29 PROCEDURE — 99999 PR PBB SHADOW E&M-EST. PATIENT-LVL II: CPT | Mod: PBBFAC,,, | Performed by: STUDENT IN AN ORGANIZED HEALTH CARE EDUCATION/TRAINING PROGRAM

## 2024-05-29 PROCEDURE — 99212 OFFICE O/P EST SF 10 MIN: CPT | Mod: PBBFAC,TH | Performed by: STUDENT IN AN ORGANIZED HEALTH CARE EDUCATION/TRAINING PROGRAM

## 2024-05-29 NOTE — PROGRESS NOTES
38w0d  Doing well today with no complaints. Generalized discomfort. Episode of spotting after intercourse last week, none since.  Excellent FM. Denies vaginal bleeding, discharge, contractions, LOF.  Today makes note that she has an intense needle phobia and does best with distracting through conversation - has had syncopal and/or shaking episodes in the past.  IOL 6/13/24.  Initial  /90 and repeat 118/86 after relaxing.  Labor, preE, FMC precautions provided. RTC 1 wk or earlier if needed.    I spent a total of 20 minutes on the day of the visit. This includes face to face time and non-face to face time preparing to see the patient (eg, review of tests with interpretation of results and explanation of results to patient), obtaining and/or reviewing separately obtained history, documenting clinical information, appropriate prenatal counseling, and care coordination.

## 2024-06-05 ENCOUNTER — ROUTINE PRENATAL (OUTPATIENT)
Dept: OBSTETRICS AND GYNECOLOGY | Facility: CLINIC | Age: 34
End: 2024-06-05
Payer: MEDICAID

## 2024-06-05 VITALS
BODY MASS INDEX: 35.73 KG/M2 | DIASTOLIC BLOOD PRESSURE: 88 MMHG | SYSTOLIC BLOOD PRESSURE: 130 MMHG | WEIGHT: 214.75 LBS

## 2024-06-05 DIAGNOSIS — Z34.03 ENCOUNTER FOR SUPERVISION OF NORMAL FIRST PREGNANCY, THIRD TRIMESTER: Primary | ICD-10-CM

## 2024-06-05 DIAGNOSIS — N96 HISTORY OF MULTIPLE MISCARRIAGES: ICD-10-CM

## 2024-06-05 DIAGNOSIS — Z3A.39 39 WEEKS GESTATION OF PREGNANCY: ICD-10-CM

## 2024-06-05 DIAGNOSIS — Z15.89 MTHFR GENE MUTATION: ICD-10-CM

## 2024-06-05 PROCEDURE — 99213 OFFICE O/P EST LOW 20 MIN: CPT | Mod: TH,S$PBB,, | Performed by: STUDENT IN AN ORGANIZED HEALTH CARE EDUCATION/TRAINING PROGRAM

## 2024-06-05 PROCEDURE — 99212 OFFICE O/P EST SF 10 MIN: CPT | Mod: PBBFAC,TH | Performed by: STUDENT IN AN ORGANIZED HEALTH CARE EDUCATION/TRAINING PROGRAM

## 2024-06-05 PROCEDURE — 99999 PR PBB SHADOW E&M-EST. PATIENT-LVL II: CPT | Mod: PBBFAC,,, | Performed by: STUDENT IN AN ORGANIZED HEALTH CARE EDUCATION/TRAINING PROGRAM

## 2024-06-05 RX ORDER — FOLIC ACID 0.4 MG
800 TABLET ORAL
COMMUNITY
Start: 2024-05-30 | End: 2024-06-18

## 2024-06-05 NOTE — H&P (VIEW-ONLY)
HISTORY AND PHYSICAL                                                OBSTETRICS        Chief Complaint: Induction of Labor     Subjective:      Radha Callaway is a 34 y.o.  female with IUP at 39w6d gestation who presents to L&D for scheduled induction of labor. Pertinent medical history for this pregnancy includes transfer of care 3rd trimester, renal pyelectasis resolved (MFM at [facility]), MDD (no meds), h/o exercise induced asthma, MTFR gene, needle phobia, recurrent SABs, and BV.  Patient reports normal fetal movement.  She denies vaginal bleeding, leakage of fluid, and regular contractions.  Care this pregnancy has been with Dr. Peterson.    PMHx:   Past Medical History:   Diagnosis Date    Asthma     BV (bacterial vaginosis)     History of recurrent miscarriages     MTHFR gene mutation      PSHx: History reviewed. No pertinent surgical history.    All: Review of patient's allergies indicates:  No Known Allergies    Meds: (Not in a hospital admission)      SH:   Social History     Socioeconomic History    Marital status: Single   Tobacco Use    Smoking status: Never    Smokeless tobacco: Never   Substance and Sexual Activity    Alcohol use: Yes     Comment: socially    Drug use: No    Sexual activity: Yes     Partners: Male     Birth control/protection: None       FH: No family history on file.    OBHx:   OB History    Para Term  AB Living   4 0 0 0 3 0   SAB IAB Ectopic Multiple Live Births   3 0 0 0 0      # Outcome Date GA Lbr Olman/2nd Weight Sex Type Anes PTL Lv   4 Current            3 SAB            2 SAB            1 SAB                Objective:      Body mass index is 35.73 kg/m².   Last 3 sets of Vitals        2024     1:51 PM 2024     1:42 PM 2024     1:43 PM   Vitals - 1 value per visit   SYSTOLIC 122 118 130   DIASTOLIC 82 86 88   Weight (lb) 211.64 214.29 214.73   Weight (kg) 96 97.2 97.4         General:   alert and cooperative   HEENT:  normocephalic, atraumatic    Lungs:   Normal work of breathing   Heart:   Normal cap refill   Abdomen:  gravid, non-tender   Extremities non-tender, no edema   Derm: no rashes or lesions   Psych: appropriate mood and affect   Pelvis:  adequate       Cervix:     Dilation: 2 cm    Effacement: 50%    Station:  -3    Consistency: moderate    Position mid     Vertex by ultrasound    Lab Review  Prenatal Labs:  Lab Results   Component Value Date    GROUPTRH A POS 2024    HGB 12.3 05/15/2024    HCT 36.7 (L) 05/15/2024     05/15/2024    HEPBSAG Non-reactive 2024    FCL27QRMF Non-reactive 05/15/2024    RPR Non-reactive 2024    LABCHLA Not Detected 2024    LABNGO Not Detected 2024    LABURIN No significant growth 2024    OBGLUCOSESCR 84 2024    STREPBCULT No Group B Streptococcus isolated 05/15/2024     33 wk growth ([place]FM [place] [facility] In Care Everywhere):  EFW 2,075 g 32w 4d 34%      Assessment:     34 y.o.  at 39w6d gestation here for elective induction of labor      Plan:     1. Risks, benefits, alternatives and possible complications of delivery, possible , possible blood transfusion, possible operative vaginal delivery have been discussed in detail with the patient. All questions have been answered, and Ms. Callaway has voiced understanding and agrees to the treatment plan.  2. Consents signed and in chart  3. Admit to Labor and Delivery unit  4. GBS neg    MDD  - no meds  - 1-2 wk PP mood check    Postpartum contraception  - Nexplanon vs POP

## 2024-06-05 NOTE — H&P
HISTORY AND PHYSICAL                                                OBSTETRICS        Chief Complaint: Induction of Labor     Subjective:      Radha Callaway is a 34 y.o.  female with IUP at 39w6d gestation who presents to L&D for scheduled induction of labor. Pertinent medical history for this pregnancy includes transfer of care 3rd trimester, [place] [facility] [place] MDD (no meds), h/o exercise induced asthma, MTFR gene, needle phobia, recurrent SABs, and BV.  Patient reports normal fetal movement.  She denies vaginal bleeding, leakage of fluid, and regular contractions.  Care this pregnancy has been with Dr. Peterson.    PMHx:   Past Medical History:   Diagnosis Date    Asthma     BV (bacterial vaginosis)     History of recurrent miscarriages     MTHFR gene mutation      PSHx: History reviewed. No pertinent surgical history.    All: Review of patient's allergies indicates:  No Known Allergies    Meds: (Not in a hospital admission)      SH:   Social History     Socioeconomic History    Marital status: Single   Tobacco Use    Smoking status: Never    Smokeless tobacco: Never   Substance and Sexual Activity    Alcohol use: Yes     Comment: socially    Drug use: No    Sexual activity: Yes     Partners: Male     Birth control/protection: None       FH: No family history on file.    OBHx:   OB History    Para Term  AB Living   4 0 0 0 3 0   SAB IAB Ectopic Multiple Live Births   3 0 0 0 0      # Outcome Date GA Lbr Olman/2nd Weight Sex Type Anes PTL Lv   4 Current            3 SAB            2 SAB            1 SAB                Objective:      Body mass index is 35.73 kg/m².   Last 3 sets of Vitals        2024     1:51 PM 2024     1:42 PM 2024     1:43 PM   Vitals - 1 value per visit   SYSTOLIC 122 118 130   DIASTOLIC 82 86 88   Weight (lb) 211.64 214.29 214.73   Weight (kg) 96 97.2 97.4         General:   alert and cooperative   HEENT:  normocephalic, atraumatic    Lungs:   Normal work of breathing   Heart:   Normal cap refill   Abdomen:  gravid, non-tender   Extremities non-tender, no edema   Derm: no rashes or lesions   Psych: appropriate mood and affect   Pelvis:  adequate       Cervix:     Dilation: 2 cm    Effacement: 50%    Station:  -3    Consistency: moderate    Position mid     Vertex by ultrasound    Lab Review  Prenatal Labs:  Lab Results   Component Value Date    GROUPTRH A POS 2024    HGB 12.3 05/15/2024    HCT 36.7 (L) 05/15/2024     05/15/2024    HEPBSAG Non-reactive 2024    LTE13VHYD Non-reactive 05/15/2024    RPR Non-reactive 2024    LABCHLA Not Detected 2024    LABNGO Not Detected 2024    LABURIN No significant growth 2024    OBGLUCOSESCR 84 2024    STREPBCULT No Group B Streptococcus isolated 05/15/2024     33 wk growth (MFM at [facility] [place]. In Care Everywhere):  EFW 2,075 g 32w 4d 34%      Assessment:     34 y.o.  at 39w6d gestation here for elective induction of labor      Plan:     1. Risks, benefits, alternatives and possible complications of delivery, possible , possible blood transfusion, possible operative vaginal delivery have been discussed in detail with the patient. All questions have been answered, and Ms. Callaway has voiced understanding and agrees to the treatment plan.  2. Consents signed and in chart  3. Admit to Labor and Delivery unit  4. GBS neg    MDD  - no meds  - 1-2 wk PP mood check    Postpartum contraception  - Nexplanon vs POP

## 2024-06-05 NOTE — PROGRESS NOTES
39w0d  Doing well today overall. Tightening and occasional numbness in bilateral hands.  Excellent FM. Denies vaginal bleeding, discharge, contractions, LOF.  Desires moving up IOL, will move up to 6/11 at midnight. Discussed IOL instructions and handout provided.  Labor, preE, FMC precautions provided.     I spent a total of 20 minutes on the day of the visit. This includes face to face time and non-face to face time preparing to see the patient (eg, review of tests with interpretation of results and explanation of results to patient), obtaining and/or reviewing separately obtained history, documenting clinical information, appropriate prenatal counseling, and care coordination.

## 2024-06-10 ENCOUNTER — PATIENT MESSAGE (OUTPATIENT)
Dept: OBSTETRICS AND GYNECOLOGY | Facility: OTHER | Age: 34
End: 2024-06-10
Payer: MEDICAID

## 2024-06-11 ENCOUNTER — ANESTHESIA EVENT (OUTPATIENT)
Dept: OBSTETRICS AND GYNECOLOGY | Facility: OTHER | Age: 34
End: 2024-06-11
Payer: MEDICAID

## 2024-06-11 ENCOUNTER — HOSPITAL ENCOUNTER (INPATIENT)
Facility: OTHER | Age: 34
LOS: 3 days | Discharge: HOME | End: 2024-06-14
Attending: STUDENT IN AN ORGANIZED HEALTH CARE EDUCATION/TRAINING PROGRAM | Admitting: OBSTETRICS & GYNECOLOGY
Payer: MEDICAID

## 2024-06-11 ENCOUNTER — ANESTHESIA (OUTPATIENT)
Dept: OBSTETRICS AND GYNECOLOGY | Facility: OTHER | Age: 34
End: 2024-06-11
Payer: MEDICAID

## 2024-06-11 DIAGNOSIS — N96 HISTORY OF MULTIPLE MISCARRIAGES: ICD-10-CM

## 2024-06-11 DIAGNOSIS — Z34.90 ENCOUNTER FOR INDUCTION OF LABOR: Primary | ICD-10-CM

## 2024-06-11 DIAGNOSIS — Z15.89 MTHFR GENE MUTATION: ICD-10-CM

## 2024-06-11 LAB
ABO + RH BLD: NORMAL
ALBUMIN SERPL BCP-MCNC: 2.5 G/DL (ref 3.5–5.2)
ALP SERPL-CCNC: 188 U/L (ref 55–135)
ALT SERPL W/O P-5'-P-CCNC: 17 U/L (ref 10–44)
ANION GAP SERPL CALC-SCNC: 7 MMOL/L (ref 8–16)
AST SERPL-CCNC: 22 U/L (ref 10–40)
BASOPHILS # BLD AUTO: 0.02 K/UL (ref 0–0.2)
BASOPHILS NFR BLD: 0.2 % (ref 0–1.9)
BILIRUB SERPL-MCNC: 0.1 MG/DL (ref 0.1–1)
BLD GP AB SCN CELLS X3 SERPL QL: NORMAL
BUN SERPL-MCNC: 13 MG/DL (ref 6–20)
CALCIUM SERPL-MCNC: 8.6 MG/DL (ref 8.7–10.5)
CHLORIDE SERPL-SCNC: 108 MMOL/L (ref 95–110)
CO2 SERPL-SCNC: 20 MMOL/L (ref 23–29)
CREAT SERPL-MCNC: 0.7 MG/DL (ref 0.5–1.4)
CREAT UR-MCNC: 150.7 MG/DL (ref 15–325)
DIFFERENTIAL METHOD BLD: ABNORMAL
EOSINOPHIL # BLD AUTO: 0.1 K/UL (ref 0–0.5)
EOSINOPHIL NFR BLD: 0.7 % (ref 0–8)
ERYTHROCYTE [DISTWIDTH] IN BLOOD BY AUTOMATED COUNT: 13.8 % (ref 11.5–14.5)
EST. GFR  (NO RACE VARIABLE): >60 ML/MIN/1.73 M^2
GLUCOSE SERPL-MCNC: 79 MG/DL (ref 70–110)
HCT VFR BLD AUTO: 35.5 % (ref 37–48.5)
HGB BLD-MCNC: 12 G/DL (ref 12–16)
IMM GRANULOCYTES # BLD AUTO: 0.03 K/UL (ref 0–0.04)
IMM GRANULOCYTES NFR BLD AUTO: 0.3 % (ref 0–0.5)
LYMPHOCYTES # BLD AUTO: 2.2 K/UL (ref 1–4.8)
LYMPHOCYTES NFR BLD: 20.4 % (ref 18–48)
MCH RBC QN AUTO: 30.4 PG (ref 27–31)
MCHC RBC AUTO-ENTMCNC: 33.8 G/DL (ref 32–36)
MCV RBC AUTO: 90 FL (ref 82–98)
MONOCYTES # BLD AUTO: 0.8 K/UL (ref 0.3–1)
MONOCYTES NFR BLD: 7.6 % (ref 4–15)
NEUTROPHILS # BLD AUTO: 7.6 K/UL (ref 1.8–7.7)
NEUTROPHILS NFR BLD: 70.8 % (ref 38–73)
NRBC BLD-RTO: 0 /100 WBC
PLATELET # BLD AUTO: 254 K/UL (ref 150–450)
PMV BLD AUTO: 12 FL (ref 9.2–12.9)
POTASSIUM SERPL-SCNC: 4.1 MMOL/L (ref 3.5–5.1)
PROT SERPL-MCNC: 6 G/DL (ref 6–8.4)
PROT UR-MCNC: 31 MG/DL (ref 0–15)
PROT/CREAT UR: 0.21 MG/G{CREAT} (ref 0–0.2)
RBC # BLD AUTO: 3.95 M/UL (ref 4–5.4)
SODIUM SERPL-SCNC: 135 MMOL/L (ref 136–145)
TREPONEMA PALLIDUM IGG+IGM AB [PRESENCE] IN SERUM OR PLASMA BY IMMUNOASSAY: NONREACTIVE
WBC # BLD AUTO: 10.69 K/UL (ref 3.9–12.7)

## 2024-06-11 PROCEDURE — 62326 NJX INTERLAMINAR LMBR/SAC: CPT | Performed by: STUDENT IN AN ORGANIZED HEALTH CARE EDUCATION/TRAINING PROGRAM

## 2024-06-11 PROCEDURE — 63600175 PHARM REV CODE 636 W HCPCS: Mod: JZ,JG | Performed by: STUDENT IN AN ORGANIZED HEALTH CARE EDUCATION/TRAINING PROGRAM

## 2024-06-11 PROCEDURE — 82570 ASSAY OF URINE CREATININE: CPT

## 2024-06-11 PROCEDURE — 86593 SYPHILIS TEST NON-TREP QUANT: CPT

## 2024-06-11 PROCEDURE — 25000003 PHARM REV CODE 250: Performed by: STUDENT IN AN ORGANIZED HEALTH CARE EDUCATION/TRAINING PROGRAM

## 2024-06-11 PROCEDURE — 86901 BLOOD TYPING SEROLOGIC RH(D): CPT

## 2024-06-11 PROCEDURE — 51702 INSERT TEMP BLADDER CATH: CPT

## 2024-06-11 PROCEDURE — 86762 RUBELLA ANTIBODY: CPT | Performed by: STUDENT IN AN ORGANIZED HEALTH CARE EDUCATION/TRAINING PROGRAM

## 2024-06-11 PROCEDURE — 86900 BLOOD TYPING SEROLOGIC ABO: CPT

## 2024-06-11 PROCEDURE — 85025 COMPLETE CBC W/AUTO DIFF WBC: CPT

## 2024-06-11 PROCEDURE — 63600175 PHARM REV CODE 636 W HCPCS

## 2024-06-11 PROCEDURE — C1751 CATH, INF, PER/CENT/MIDLINE: HCPCS | Performed by: ANESTHESIOLOGY

## 2024-06-11 PROCEDURE — 72100002 HC LABOR CARE, 1ST 8 HOURS

## 2024-06-11 PROCEDURE — 11000001 HC ACUTE MED/SURG PRIVATE ROOM

## 2024-06-11 PROCEDURE — 80053 COMPREHEN METABOLIC PANEL: CPT

## 2024-06-11 PROCEDURE — 59409 OBSTETRICAL CARE: CPT | Mod: AA,,, | Performed by: ANESTHESIOLOGY

## 2024-06-11 PROCEDURE — 27200710 HC EPIDURAL INFUSION PUMP SET: Performed by: ANESTHESIOLOGY

## 2024-06-11 RX ORDER — OXYTOCIN/RINGER'S LACTATE 30/500 ML
334 PLASTIC BAG, INJECTION (ML) INTRAVENOUS ONCE
Status: DISCONTINUED | OUTPATIENT
Start: 2024-06-11 | End: 2024-06-12

## 2024-06-11 RX ORDER — SIMETHICONE 80 MG
1 TABLET,CHEWABLE ORAL 4 TIMES DAILY PRN
Status: DISCONTINUED | OUTPATIENT
Start: 2024-06-11 | End: 2024-06-12

## 2024-06-11 RX ORDER — SODIUM CHLORIDE, SODIUM LACTATE, POTASSIUM CHLORIDE, CALCIUM CHLORIDE 600; 310; 30; 20 MG/100ML; MG/100ML; MG/100ML; MG/100ML
INJECTION, SOLUTION INTRAVENOUS CONTINUOUS
Status: DISCONTINUED | OUTPATIENT
Start: 2024-06-11 | End: 2024-06-12

## 2024-06-11 RX ORDER — SODIUM CHLORIDE 9 MG/ML
INJECTION, SOLUTION INTRAVENOUS
Status: DISCONTINUED | OUTPATIENT
Start: 2024-06-11 | End: 2024-06-12

## 2024-06-11 RX ORDER — DIPHENOXYLATE HYDROCHLORIDE AND ATROPINE SULFATE 2.5; .025 MG/1; MG/1
2 TABLET ORAL EVERY 6 HOURS PRN
Status: DISCONTINUED | OUTPATIENT
Start: 2024-06-11 | End: 2024-06-12

## 2024-06-11 RX ORDER — CARBOPROST TROMETHAMINE 250 UG/ML
250 INJECTION, SOLUTION INTRAMUSCULAR
Status: DISCONTINUED | OUTPATIENT
Start: 2024-06-11 | End: 2024-06-12

## 2024-06-11 RX ORDER — OXYTOCIN/RINGER'S LACTATE 30/500 ML
95 PLASTIC BAG, INJECTION (ML) INTRAVENOUS ONCE AS NEEDED
Status: DISCONTINUED | OUTPATIENT
Start: 2024-06-11 | End: 2024-06-12

## 2024-06-11 RX ORDER — MISOPROSTOL 200 UG/1
800 TABLET ORAL ONCE AS NEEDED
Status: DISCONTINUED | OUTPATIENT
Start: 2024-06-11 | End: 2024-06-12

## 2024-06-11 RX ORDER — SODIUM CITRATE AND CITRIC ACID MONOHYDRATE 334; 500 MG/5ML; MG/5ML
30 SOLUTION ORAL ONCE
Status: DISCONTINUED | OUTPATIENT
Start: 2024-06-11 | End: 2024-06-12

## 2024-06-11 RX ORDER — OXYTOCIN/RINGER'S LACTATE 30/500 ML
334 PLASTIC BAG, INJECTION (ML) INTRAVENOUS ONCE AS NEEDED
Status: DISCONTINUED | OUTPATIENT
Start: 2024-06-11 | End: 2024-06-12

## 2024-06-11 RX ORDER — OXYTOCIN 10 [USP'U]/ML
10 INJECTION, SOLUTION INTRAMUSCULAR; INTRAVENOUS ONCE AS NEEDED
Status: DISCONTINUED | OUTPATIENT
Start: 2024-06-11 | End: 2024-06-12

## 2024-06-11 RX ORDER — FAMOTIDINE 10 MG/ML
20 INJECTION INTRAVENOUS ONCE
Status: DISCONTINUED | OUTPATIENT
Start: 2024-06-11 | End: 2024-06-12

## 2024-06-11 RX ORDER — LIDOCAINE HYDROCHLORIDE 10 MG/ML
10 INJECTION INFILTRATION; PERINEURAL ONCE AS NEEDED
Status: DISCONTINUED | OUTPATIENT
Start: 2024-06-11 | End: 2024-06-12

## 2024-06-11 RX ORDER — TRANEXAMIC ACID 10 MG/ML
1000 INJECTION, SOLUTION INTRAVENOUS EVERY 30 MIN PRN
Status: DISCONTINUED | OUTPATIENT
Start: 2024-06-11 | End: 2024-06-12

## 2024-06-11 RX ORDER — BUPIVACAINE HYDROCHLORIDE 2.5 MG/ML
INJECTION, SOLUTION EPIDURAL; INFILTRATION; INTRACAUDAL
Status: DISCONTINUED | OUTPATIENT
Start: 2024-06-11 | End: 2024-06-12

## 2024-06-11 RX ORDER — ACETAMINOPHEN 500 MG
TABLET ORAL
Status: DISPENSED
Start: 2024-06-11 | End: 2024-06-12

## 2024-06-11 RX ORDER — ONDANSETRON 8 MG/1
8 TABLET, ORALLY DISINTEGRATING ORAL EVERY 8 HOURS PRN
Status: DISCONTINUED | OUTPATIENT
Start: 2024-06-11 | End: 2024-06-12

## 2024-06-11 RX ORDER — METHYLERGONOVINE MALEATE 0.2 MG/ML
200 INJECTION INTRAVENOUS ONCE AS NEEDED
Status: DISCONTINUED | OUTPATIENT
Start: 2024-06-11 | End: 2024-06-12

## 2024-06-11 RX ORDER — SODIUM CHLORIDE 0.9 % (FLUSH) 0.9 %
2 SYRINGE (ML) INJECTION
Status: DISCONTINUED | OUTPATIENT
Start: 2024-06-11 | End: 2024-06-12

## 2024-06-11 RX ORDER — MISOPROSTOL 200 UG/1
800 TABLET ORAL ONCE AS NEEDED
Status: COMPLETED | OUTPATIENT
Start: 2024-06-11 | End: 2024-06-12

## 2024-06-11 RX ORDER — OXYTOCIN/RINGER'S LACTATE 30/500 ML
95 PLASTIC BAG, INJECTION (ML) INTRAVENOUS ONCE
Status: DISCONTINUED | OUTPATIENT
Start: 2024-06-11 | End: 2024-06-12

## 2024-06-11 RX ORDER — FENTANYL/BUPIVACAINE/NS/PF 2MCG/ML-.1
PLASTIC BAG, INJECTION (ML) INJECTION
Status: DISPENSED
Start: 2024-06-11 | End: 2024-06-12

## 2024-06-11 RX ORDER — LIDOCAINE HYDROCHLORIDE AND EPINEPHRINE 15; 5 MG/ML; UG/ML
INJECTION, SOLUTION EPIDURAL
Status: DISCONTINUED | OUTPATIENT
Start: 2024-06-11 | End: 2024-06-12

## 2024-06-11 RX ORDER — FENTANYL/BUPIVACAINE/NS/PF 2MCG/ML-.1
PLASTIC BAG, INJECTION (ML) INJECTION
Status: COMPLETED
Start: 2024-06-11 | End: 2024-06-11

## 2024-06-11 RX ORDER — ALBUTEROL SULFATE 2.5 MG/.5ML
2.5 SOLUTION RESPIRATORY (INHALATION) EVERY 6 HOURS PRN
Status: DISCONTINUED | OUTPATIENT
Start: 2024-06-11 | End: 2024-06-12

## 2024-06-11 RX ORDER — FENTANYL/BUPIVACAINE/NS/PF 2MCG/ML-.1
PLASTIC BAG, INJECTION (ML) INJECTION CONTINUOUS
Status: DISCONTINUED | OUTPATIENT
Start: 2024-06-11 | End: 2024-06-12

## 2024-06-11 RX ORDER — ACETAMINOPHEN 500 MG
1000 TABLET ORAL ONCE
Status: COMPLETED | OUTPATIENT
Start: 2024-06-11 | End: 2024-06-11

## 2024-06-11 RX ORDER — CALCIUM CARBONATE 200(500)MG
500 TABLET,CHEWABLE ORAL 3 TIMES DAILY PRN
Status: DISCONTINUED | OUTPATIENT
Start: 2024-06-11 | End: 2024-06-12

## 2024-06-11 RX ORDER — OXYTOCIN/RINGER'S LACTATE 30/500 ML
0-32 PLASTIC BAG, INJECTION (ML) INTRAVENOUS CONTINUOUS
Status: DISCONTINUED | OUTPATIENT
Start: 2024-06-11 | End: 2024-06-12

## 2024-06-11 RX ADMIN — LIDOCAINE HYDROCHLORIDE,EPINEPHRINE BITARTRATE 3 ML: 15; .005 INJECTION, SOLUTION EPIDURAL; INFILTRATION; INTRACAUDAL; PERINEURAL at 04:06

## 2024-06-11 RX ADMIN — ACETAMINOPHEN 1000 MG: 500 TABLET ORAL at 11:06

## 2024-06-11 RX ADMIN — SODIUM CHLORIDE, POTASSIUM CHLORIDE, SODIUM LACTATE AND CALCIUM CHLORIDE: 600; 310; 30; 20 INJECTION, SOLUTION INTRAVENOUS at 01:06

## 2024-06-11 RX ADMIN — OXYTOCIN 4 MILLI-UNITS/MIN: 10 INJECTION, SOLUTION INTRAMUSCULAR; INTRAVENOUS at 03:06

## 2024-06-11 RX ADMIN — BUPIVACAINE HYDROCHLORIDE 6 ML: 2.5 INJECTION, SOLUTION EPIDURAL; INFILTRATION; INTRACAUDAL; PERINEURAL at 08:06

## 2024-06-11 RX ADMIN — Medication 10.6 ML/HR: at 04:06

## 2024-06-11 RX ADMIN — Medication 5 ML: at 04:06

## 2024-06-11 RX ADMIN — Medication 10.6 ML/HR: at 11:06

## 2024-06-11 RX ADMIN — SODIUM CHLORIDE, POTASSIUM CHLORIDE, SODIUM LACTATE AND CALCIUM CHLORIDE: 600; 310; 30; 20 INJECTION, SOLUTION INTRAVENOUS at 11:06

## 2024-06-11 NOTE — PLAN OF CARE
Problem: Adult Inpatient Plan of Care  Goal: Plan of Care Review  Outcome: Progressing     Problem: Adult Inpatient Plan of Care  Goal: Patient-Specific Goal (Individualized)  Outcome: Progressing     Problem: Adult Inpatient Plan of Care  Goal: Absence of Hospital-Acquired Illness or Injury  Outcome: Progressing     Problem: Adult Inpatient Plan of Care  Goal: Optimal Comfort and Wellbeing  Outcome: Progressing     Problem: Infection  Goal: Absence of Infection Signs and Symptoms  Outcome: Progressing     Problem: Labor  Goal: Stable Fetal Wellbeing  Outcome: Progressing

## 2024-06-11 NOTE — ANESTHESIA PREPROCEDURE EVALUATION
[facility]  Obstetric Anesthesia Pre-Procedure Evaluation         Patient Name: Radha Callaway  YOB: 1990  MRN: [id number]    2024    SUBJECTIVE:     Radha Callaway is a 34 y.o. female  at 39w6d who presents for scheduled term IOL. Current IUP has been complicated by +MTHFR gene mutation, depression, and significant needle phobia.     Previous pregnancies have been complicated by recurrent spontaneous miscarriage.    She denies previous neuraxial anesthesia. She would like an epidural with this delivery but requests a staff anesthesiologist place her epidural due to her intense needle phobia.   She denies issues with previous general anesthesia.     She denies history of HTN, bleeding or coagulation disorders, spine abnormalities, or previous back surgeries.      OB History    Para Term  AB Living   4       3     SAB IAB Ectopic Multiple Live Births   3 0            # Outcome Date GA Lbr Olman/2nd Weight Sex Type Anes PTL Lv   4 Current            3 SAB            2 SAB            1 SAB                Review of patient's allergies indicates:  No Known Allergies    Patient Active Problem List   Diagnosis    MTHFR gene mutation    History of multiple miscarriages    Encounter for induction of labor       No past surgical history on file.    Tobacco Use: Low Risk  (2024)    Patient History     Smoking Tobacco Use: Never     Smokeless Tobacco Use: Never     Passive Exposure: Not on file     Alcohol Use: Not on file     Social History     Substance and Sexual Activity   Drug Use No       OBJECTIVE:     Vital Signs:         Wt Readings from Last 1 Encounters:   24 1343 97.4 kg (214 lb 11.7 oz)       BP Readings from Last 3 Encounters:   24 130/88   24 118/86   24 122/82       Significant Labs    Heme Profile  Lab Results   Component Value Date    WBC 10.69 2024    HGB 12.0 2024    HCT 35.5 (L) 2024      2024       BMP  Lab Results   Component Value Date     (L) 2024    K 3.6 2024     2024    CO2 22 (L) 2024    BUN 9 2024    CREATININE 0.6 2024       Liver Function Tests  Lab Results   Component Value Date    AST 16 2024    ALT 23 2024    ALKPHOS 90 2024    BILITOT 0.1 2024    PROT 5.9 (L) 2024    ALBUMIN 2.5 (L) 2024       ASSESSMENT/PLAN:       Pre-op Assessment    I have reviewed the Patient Summary Reports.     I have reviewed the Nursing Notes. I have reviewed the NPO Status.   I have reviewed the Medications.     Review of Systems  Anesthesia Hx:  No problems with previous Anesthesia               Denies Personal Hx of Anesthesia complications.                    Social:  Non-Smoker, No Alcohol Use       Hematology/Oncology:       -- Denies Anemia:              --  Coag Disorders: Denies Bleeding Disorder:                        Cardiovascular:      Denies Hypertension.  Denies Valvular problems/Murmurs.    Denies Dysrhythmias.    Denies CHF.                                 Pulmonary:    Asthma (exercise-induced)   Denies Shortness of breath.                  Renal/:   Denies Chronic Renal Disease.                Hepatic/GI:      Denies Liver Disease.            OB/GYN/PEDS:   Pt's Obstetrician is Nicholas. Planned Vaginal Delivery                4  , Para 0        Denies Neuraxial Anesthesia - Previous History              Neurological:    Denies CVA.    Denies Seizures.                                Endocrine:  Denies Diabetes.           Psych:    depression    Depression and Major Depression, Treated.             Physical Exam  General: Well nourished, Alert and Anxious    Airway:  Mallampati: IV / II  Mouth Opening: Normal  TM Distance: Normal  Tongue: Normal    Dental:  Intact        Anesthesia Plan  Type of Anesthesia, risks & benefits discussed:    Anesthesia Type: Epidural, Spinal, CSE, Gen ETT  Intra-op  Monitoring Plan: Standard ASA Monitors  Post Op Pain Control Plan: multimodal analgesia and IV/PO Opioids PRN  Informed Consent: Informed consent signed with the Patient and all parties understand the risks and agree with anesthesia plan.  All questions answered.   ASA Score: 2  Day of Surgery Review of History & Physical: H&P Update referred to the surgeon/provider.    Ready For Surgery From Anesthesia Perspective.     .

## 2024-06-11 NOTE — ANESTHESIA PROCEDURE NOTES
Epidural    Patient location during procedure: OB   Reason for block: primary anesthetic   Reason for block: labor analgesia requested by patient and obstetrician  Diagnosis: Intrauterine pregnancy   Start time: 6/11/2024 4:27 AM  Timeout: 6/11/2024 4:25 AM  End time: 6/11/2024 4:31 AM  Surgery related to: Vaginal Delivery    Staffing  Performing Provider: Fabian Umana MD  Authorizing Provider: Janice Marshall MD    Staffing  Performed by: Fabian Umana MD  Authorized by: Janice Marshall MD        Preanesthetic Checklist  Completed: patient identified, IV checked, risks and benefits discussed, surgical consent, monitors and equipment checked, pre-op evaluation, timeout performed, anesthesia consent given, hand hygiene performed and patient being monitored  Preparation  Patient position: sitting  Prep: ChloraPrep  Patient monitoring: Pulse Ox and Blood Pressure  Reason for block: primary anesthetic   Epidural  Skin Anesthetic: lidocaine 1%  Skin Wheal: 3 mL  Administration type: continuous  Approach: midline  Interspace: L4-5    Injection technique: KALLI air  Needle and Epidural Catheter  Needle type: Tuohy   Needle gauge: 17  Needle length: 3.5 inches  Needle insertion depth: 6 cm  Catheter type: [employer]  Catheter size: 19 G  Catheter at skin depth: 11 cm  Insertion Attempts: 1  Test dose: 3 mL of lidocaine 1.5% with Epi 1-to-200,000  Additional Documentation: incremental injection, negative aspiration for heme and CSF, no paresthesia on injection, no signs/symptoms of IV or SA injection, no significant pain on injection and no significant complaints from patient  Needle localization: anatomical landmarks  Assessment  Ease of block: easy  Patient's tolerance of the procedure: comfortable throughout block and no complaints No inadvertent dural puncture with Tuohy.  Dural puncture performed with spinal needle.

## 2024-06-11 NOTE — PROGRESS NOTES
LABOR NOTE    S:  Complaints: No.  Epidural working:  yes  Resident to bedside for routine cervical exam    O: /66   Pulse 80   Temp 97.7 °F (36.5 °C) (Oral)   Resp 14   SpO2 96%   Breastfeeding Yes     FHT: 140bpm; moderate variability; +accels/-decels; Cat 1 (reassuring)  CTX: q 2-3 minutes, pit @ 12  SVE: 3/80/-3    TIMELINE:  0230: 3/80/-3  0730: 3/80/-3, pit @ 12    PLAN:    Continue Close Maternal/Fetal Monitoring  Pitocin Augmentation per protocol  Recheck 2-4 hours or PRN    Sandy Goodman MD  Obstetrics and Gynecology - PGY1

## 2024-06-11 NOTE — INTERVAL H&P NOTE
The patient has been examined and the H&P has been reviewed:    I concur with the findings and no changes have occurred since H&P was written.    Radha Callaway is 34 y.o.  at 39w6d wga presenting for IOL.          FHT: 130 bpm, moderate BTBV, + accels, - decels; Cat 1 (reassuring)  Sewaren: no contractions  Presentation: cephalic by ultrasound    SVE: 3/80/-3    1) Induction of Labor  - Plan for pitocin     Contraception: per primary OB    Active Hospital Problems    Diagnosis  POA    *Encounter for induction of labor [Z34.90]  Not Applicable      Resolved Hospital Problems   No resolved problems to display.

## 2024-06-11 NOTE — PROGRESS NOTES
LABOR NOTE    S:  Complaints: No.  Epidural working:  yes  Resident to bedside for routine cervical exam    O: /77   Pulse 82   Temp 98.1 °F (36.7 °C)   Resp 19   SpO2 97%   Breastfeeding Yes     FHT: 135 bpm; moderate variability; +accels/+ occasional variable decels; Cat 2 (overall reassuring)  CTX: q 2-3 minutes, pit @ 20  SVE: 6/90/-2    TIMELINE:  0230: 3/80/-3  0730: 3/80/-3, pit @ 12  1130: 5/90/-2, pit @ 16  1530: 6/90/-2, pit @ 20    PLAN:  Continue Close Maternal/Fetal Monitoring  Pitocin Augmentation per protocol  Recheck 2-4 hours or PRN    Kelli Roldan MD  OB/GYN PGY-2

## 2024-06-11 NOTE — PROGRESS NOTES
LABOR NOTE    S:  Complaints: No.  Epidural working:  yes  Resident to bedside for routine cervical exam    O: BP (!) 147/99   Pulse 87   Temp 98.3 °F (36.8 °C) (Oral)   Resp 17   SpO2 96%   Breastfeeding Yes     FHT: 135 bpm; moderate variability; +accels/- decels; Cat 1 (overall reassuring)  CTX: q 2-3 minutes, pit @ 20  SVE: 8/90/-2    TIMELINE:  0230: 3/80/-3  0730: 3/80/-3, pit @ 12  1130: 5/90/-2, pit @ 16  1530: 6/90/-2, pit @ 20  1800: 8/90/0, pit @ 20    PLAN:  Continue Close Maternal/Fetal Monitoring  Pitocin Augmentation per protocol  Recheck 2-4 hours or PRN    Jorge Alberto Stuart MD  OBGYN PGY-2

## 2024-06-12 LAB
RUBV IGG SER-ACNC: 35.8 IU/ML
RUBV IGG SER-IMP: REACTIVE

## 2024-06-12 PROCEDURE — 0HQ9XZZ REPAIR PERINEUM SKIN, EXTERNAL APPROACH: ICD-10-PCS | Performed by: STUDENT IN AN ORGANIZED HEALTH CARE EDUCATION/TRAINING PROGRAM

## 2024-06-12 PROCEDURE — 25000003 PHARM REV CODE 250: Performed by: GENERAL PRACTICE

## 2024-06-12 PROCEDURE — 51701 INSERT BLADDER CATHETER: CPT

## 2024-06-12 PROCEDURE — 59409 OBSTETRICAL CARE: CPT | Mod: GB,,, | Performed by: STUDENT IN AN ORGANIZED HEALTH CARE EDUCATION/TRAINING PROGRAM

## 2024-06-12 PROCEDURE — 25000003 PHARM REV CODE 250

## 2024-06-12 PROCEDURE — 72100003 HC LABOR CARE, EA. ADDL. 8 HRS

## 2024-06-12 PROCEDURE — 11000001 HC ACUTE MED/SURG PRIVATE ROOM

## 2024-06-12 PROCEDURE — 72200005 HC VAGINAL DELIVERY LEVEL II

## 2024-06-12 PROCEDURE — 63600175 PHARM REV CODE 636 W HCPCS: Performed by: GENERAL PRACTICE

## 2024-06-12 RX ORDER — OXYTOCIN 10 [USP'U]/ML
10 INJECTION, SOLUTION INTRAMUSCULAR; INTRAVENOUS ONCE AS NEEDED
Status: DISCONTINUED | OUTPATIENT
Start: 2024-06-12 | End: 2024-06-14 | Stop reason: HOSPADM

## 2024-06-12 RX ORDER — ACETAMINOPHEN 325 MG/1
650 TABLET ORAL EVERY 6 HOURS PRN
Status: DISCONTINUED | OUTPATIENT
Start: 2024-06-12 | End: 2024-06-14 | Stop reason: HOSPADM

## 2024-06-12 RX ORDER — OXYTOCIN/RINGER'S LACTATE 30/500 ML
95 PLASTIC BAG, INJECTION (ML) INTRAVENOUS ONCE
Status: COMPLETED | OUTPATIENT
Start: 2024-06-12 | End: 2024-06-12

## 2024-06-12 RX ORDER — PRENATAL WITH FERROUS FUM AND FOLIC ACID 3080; 920; 120; 400; 22; 1.84; 3; 20; 10; 1; 12; 200; 27; 25; 2 [IU]/1; [IU]/1; MG/1; [IU]/1; MG/1; MG/1; MG/1; MG/1; MG/1; MG/1; UG/1; MG/1; MG/1; MG/1; MG/1
1 TABLET ORAL DAILY
Status: DISCONTINUED | OUTPATIENT
Start: 2024-06-12 | End: 2024-06-14 | Stop reason: HOSPADM

## 2024-06-12 RX ORDER — DIPHENHYDRAMINE HYDROCHLORIDE 50 MG/ML
25 INJECTION INTRAMUSCULAR; INTRAVENOUS EVERY 4 HOURS PRN
Status: DISCONTINUED | OUTPATIENT
Start: 2024-06-12 | End: 2024-06-14 | Stop reason: HOSPADM

## 2024-06-12 RX ORDER — HYDROCODONE BITARTRATE AND ACETAMINOPHEN 5; 325 MG/1; MG/1
1 TABLET ORAL EVERY 4 HOURS PRN
Status: DISCONTINUED | OUTPATIENT
Start: 2024-06-12 | End: 2024-06-12

## 2024-06-12 RX ORDER — METHYLERGONOVINE MALEATE 0.2 MG/ML
200 INJECTION INTRAVENOUS ONCE AS NEEDED
Status: DISCONTINUED | OUTPATIENT
Start: 2024-06-12 | End: 2024-06-14 | Stop reason: HOSPADM

## 2024-06-12 RX ORDER — DOCUSATE SODIUM 100 MG/1
200 CAPSULE, LIQUID FILLED ORAL 2 TIMES DAILY PRN
Status: DISCONTINUED | OUTPATIENT
Start: 2024-06-12 | End: 2024-06-14 | Stop reason: HOSPADM

## 2024-06-12 RX ORDER — DIPHENOXYLATE HYDROCHLORIDE AND ATROPINE SULFATE 2.5; .025 MG/1; MG/1
2 TABLET ORAL EVERY 6 HOURS PRN
Status: DISCONTINUED | OUTPATIENT
Start: 2024-06-12 | End: 2024-06-14 | Stop reason: HOSPADM

## 2024-06-12 RX ORDER — OXYCODONE HYDROCHLORIDE 5 MG/1
5 TABLET ORAL EVERY 6 HOURS PRN
Status: DISCONTINUED | OUTPATIENT
Start: 2024-06-12 | End: 2024-06-14 | Stop reason: HOSPADM

## 2024-06-12 RX ORDER — OXYTOCIN/RINGER'S LACTATE 30/500 ML
95 PLASTIC BAG, INJECTION (ML) INTRAVENOUS ONCE AS NEEDED
Status: DISCONTINUED | OUTPATIENT
Start: 2024-06-12 | End: 2024-06-14 | Stop reason: HOSPADM

## 2024-06-12 RX ORDER — MISOPROSTOL 200 UG/1
800 TABLET ORAL ONCE AS NEEDED
Status: DISCONTINUED | OUTPATIENT
Start: 2024-06-12 | End: 2024-06-14 | Stop reason: HOSPADM

## 2024-06-12 RX ORDER — HYDROCORTISONE 25 MG/G
CREAM TOPICAL 3 TIMES DAILY PRN
Status: DISCONTINUED | OUTPATIENT
Start: 2024-06-12 | End: 2024-06-14 | Stop reason: HOSPADM

## 2024-06-12 RX ORDER — HYDROCODONE BITARTRATE AND ACETAMINOPHEN 10; 325 MG/1; MG/1
1 TABLET ORAL EVERY 4 HOURS PRN
Status: DISCONTINUED | OUTPATIENT
Start: 2024-06-12 | End: 2024-06-12

## 2024-06-12 RX ORDER — SODIUM CHLORIDE 0.9 % (FLUSH) 0.9 %
10 SYRINGE (ML) INJECTION
Status: DISCONTINUED | OUTPATIENT
Start: 2024-06-12 | End: 2024-06-14 | Stop reason: HOSPADM

## 2024-06-12 RX ORDER — ONDANSETRON 8 MG/1
8 TABLET, ORALLY DISINTEGRATING ORAL EVERY 8 HOURS PRN
Status: DISCONTINUED | OUTPATIENT
Start: 2024-06-12 | End: 2024-06-14 | Stop reason: HOSPADM

## 2024-06-12 RX ORDER — OXYCODONE HYDROCHLORIDE 10 MG/1
10 TABLET ORAL EVERY 6 HOURS PRN
Status: DISCONTINUED | OUTPATIENT
Start: 2024-06-12 | End: 2024-06-14 | Stop reason: HOSPADM

## 2024-06-12 RX ORDER — OXYTOCIN/RINGER'S LACTATE 30/500 ML
334 PLASTIC BAG, INJECTION (ML) INTRAVENOUS ONCE AS NEEDED
Status: DISCONTINUED | OUTPATIENT
Start: 2024-06-12 | End: 2024-06-14 | Stop reason: HOSPADM

## 2024-06-12 RX ORDER — CARBOPROST TROMETHAMINE 250 UG/ML
250 INJECTION, SOLUTION INTRAMUSCULAR
Status: DISCONTINUED | OUTPATIENT
Start: 2024-06-12 | End: 2024-06-14 | Stop reason: HOSPADM

## 2024-06-12 RX ORDER — CYCLOBENZAPRINE HCL 10 MG
10 TABLET ORAL ONCE
Status: COMPLETED | OUTPATIENT
Start: 2024-06-12 | End: 2024-06-12

## 2024-06-12 RX ORDER — IBUPROFEN 600 MG/1
600 TABLET ORAL EVERY 6 HOURS
Status: DISCONTINUED | OUTPATIENT
Start: 2024-06-12 | End: 2024-06-14 | Stop reason: HOSPADM

## 2024-06-12 RX ORDER — SIMETHICONE 80 MG
1 TABLET,CHEWABLE ORAL EVERY 6 HOURS PRN
Status: DISCONTINUED | OUTPATIENT
Start: 2024-06-12 | End: 2024-06-14 | Stop reason: HOSPADM

## 2024-06-12 RX ORDER — TRANEXAMIC ACID 10 MG/ML
1000 INJECTION, SOLUTION INTRAVENOUS EVERY 30 MIN PRN
Status: DISCONTINUED | OUTPATIENT
Start: 2024-06-12 | End: 2024-06-14 | Stop reason: HOSPADM

## 2024-06-12 RX ORDER — DIPHENHYDRAMINE HCL 25 MG
25 CAPSULE ORAL EVERY 4 HOURS PRN
Status: DISCONTINUED | OUTPATIENT
Start: 2024-06-12 | End: 2024-06-14 | Stop reason: HOSPADM

## 2024-06-12 RX ADMIN — ACETAMINOPHEN 650 MG: 325 TABLET, FILM COATED ORAL at 03:06

## 2024-06-12 RX ADMIN — MISOPROSTOL 800 MCG: 200 TABLET ORAL at 12:06

## 2024-06-12 RX ADMIN — IBUPROFEN 600 MG: 600 TABLET, FILM COATED ORAL at 06:06

## 2024-06-12 RX ADMIN — CYCLOBENZAPRINE 10 MG: 10 TABLET, FILM COATED ORAL at 03:06

## 2024-06-12 RX ADMIN — PRENATAL VIT W/ FE FUMARATE-FA TAB 27-0.8 MG 1 TABLET: 27-0.8 TAB at 09:06

## 2024-06-12 RX ADMIN — DOCUSATE SODIUM 200 MG: 100 CAPSULE, LIQUID FILLED ORAL at 09:06

## 2024-06-12 RX ADMIN — OXYTOCIN 95 MILLI-UNITS/MIN: 10 INJECTION, SOLUTION INTRAMUSCULAR; INTRAVENOUS at 01:06

## 2024-06-12 RX ADMIN — IBUPROFEN 600 MG: 600 TABLET, FILM COATED ORAL at 05:06

## 2024-06-12 RX ADMIN — OXYCODONE HYDROCHLORIDE 5 MG: 5 TABLET ORAL at 09:06

## 2024-06-12 RX ADMIN — OXYCODONE HYDROCHLORIDE 10 MG: 10 TABLET ORAL at 03:06

## 2024-06-12 RX ADMIN — IBUPROFEN 600 MG: 600 TABLET, FILM COATED ORAL at 01:06

## 2024-06-12 RX ADMIN — IBUPROFEN 600 MG: 600 TABLET, FILM COATED ORAL at 11:06

## 2024-06-12 NOTE — PROGRESS NOTES
LABOR NOTE    S:  Complaints: No.  Epidural working:  yes  Resident to bedside for routine cervical exam    O: BP (!) 145/87   Pulse 90   Temp 98.3 °F (36.8 °C) (Oral)   Resp 17   SpO2 98%   Breastfeeding Yes     FHT: 135 bpm; moderate variability; +accels/- decels; Cat 1 (overall reassuring)  CTX: q 2-3 minutes, pit @ 20  SVE: 9/90/0    TIMELINE:  0230: 3/80/-3  0730: 3/80/-3, pit @ 12  1130: 5/90/-2, pit @ 16  1530: 6/90/-2, pit @ 20  1800: 8/90/0, pit @ 20  2015: 9/90/0, pit @ 20    PLAN:  Continue Close Maternal/Fetal Monitoring  Pitocin Augmentation per protocol  Recheck 2 hours or PRN    Jorge Alberto Stuart MD  OBGYN PGY-2

## 2024-06-12 NOTE — L&D DELIVERY NOTE
[facility] - Labor & Delivery  Vaginal Delivery   Operative Note    SUMMARY     Normal spontaneous vaginal delivery of live infant after approximately 90 minutes of maternal pushing effort. Infant delivered position OA over intact perineum. Nuchal cord: No. Infant was placed on mothers abdomen for skin to skin and bulb suctioning performed. Delayed cord clamping was performed as infant appeared vigorous. Cord was clamped and cut and cord blood was collected.    Spontaneous delivery of placenta with gentle traction applied. IV pitocin was given noting good uterine tone. No trailing membranes were noted on bimanual examination. Placenta was inspected and noted to be intact.    Small vaginal laceration noted and hemostasis obtained with a single figure of eight suture of 2-0 Chromic  .    Patient tolerated delivery well. Sponge, needle, and lap counted correctly x2. .    Steff Cox MD  Obstetrics and Gynecology, PGY-1    Indications: Encounter for induction of labor  Pregnancy complicated by:   Patient Active Problem List   Diagnosis    MTHFR gene mutation    History of multiple miscarriages    Encounter for induction of labor     Admitting GA: 40w0d    Delivery Information for Kannan Callaway    Birth information:  YOB: 2024   Time of birth: 12:37 AM   Sex: male   Head Delivery Date/Time: 2024 12:37 AM   Delivery type: Vaginal, Spontaneous   Gestational Age: 40w0d        Delivery Providers    Delivering clinician: Marissa Peterson MD   Provider Role    Steff Cox MD Resident    Princess Ann RN Registered Nurse    Debbi Drew RN Registered Nurse    Megan Cesar RN Charge Nurse    JohnnyChris smith, ST Surgical Tech              Measurements    Weight: 3450 g  Weight (lbs): 7 lb 9.7 oz  Length:          Apgars    Living status: Living  Apgar Component Scores:  1 min.:  5 min.:  10 min.:  15 min.:  20 min.:    Skin color:  0  1       Heart rate:  2  2       Reflex  irritability:  1  2       Muscle tone:  2  2       Respiratory effort:  2  2       Total:  7  9       Apgars assigned by: KAVIN COBB         Operative Delivery    Forceps attempted?: No  Vacuum extractor attempted?: No         Shoulder Dystocia    Shoulder dystocia present?: No           Presentation    Presentation: Vertex  Position: Left Occiput Anterior           Interventions/Resuscitation    Method: Bulb Suctioning, Blow By Oxygen, Tactile Stimulation, Deep Suctioning, CPAP       Cord    Vessels: 3 vessels  Complications: None  Delayed Cord Clamping?: Yes  Cord Clamped Date/Time: 2024 12:38 AM  Cord Blood Disposition: Discarded  Gases Sent?: No  Stem Cell Collection (by MD): No       Placenta    Placenta delivery date/time: 2024 0041  Placenta removal: Spontaneous  Placenta appearance: Intact  Placenta disposition: Discarded           Labor Events:       labor: No     Labor Onset Date/Time:         Dilation Complete Date/Time:         Start Pushing Date/Time: 2024 23:10       Start Pushing Date/Time: 2024 23:10     Rupture Date/Time: 24  1123         Rupture type: SRM (Spontaneous Rupture)         Fluid Amount:       Fluid Color:                steroids: None     Antibiotics given for GBS: No     Induction:       Indications for induction:  Elective     Augmentation: oxytocin     Indications for augmentation: Ineffective Contraction Pattern     Labor complications: None     Additional complications:          Cervical ripening:                     Delivery:      Episiotomy: None     Indication for Episiotomy:       Perineal Lacerations: 1st Repaired:  Yes   Periurethral Laceration:   Repaired:     Labial Laceration:   Repaired:     Sulcus Laceration:   Repaired:     Vaginal Laceration:   Repaired:     Cervical Laceration:   Repaired:     Repair suture:       Repair # of packets: 1     Last Value - EBL - Nursing (mL):       Sum - EBL - Nursing (mL): 0     Last Value - EBL  - Anesthesia (mL):      Calculated QBL (mL): 537      Running total QBL (mL): 537      Vaginal Sweep Performed: Yes     Surgicount Correct: Yes     Vaginal Packing: No Quantity:       Other providers:       Anesthesia    Method: Epidural          Details (if applicable):  Trial of Labor      Categorization:      Priority:     Indications for :     Incision Type:       Additional  information:  Forceps:    Vacuum:    Breech:    Observed anomalies    Other (Comments):

## 2024-06-12 NOTE — LACTATION NOTE
Lactation visited pt. Breastfeeding basics reviewed. Pt is bottle feeding formula in addition to breastfeeding. Pt aware of the risks to her milk supply to breastfeed every other feeding. Pt encouraged to breastfeed her baby 8 or more times in 24hrs on cue until content. Baby latched to the breast in laid back position with assistance given. Baby taking good sucks and swallows noted. Pt verbalized understanding of education.   06/12/24 1420   Maternal Assessment   Breast Shape Bilateral:;round   Breast Density Bilateral:;soft   Areola Bilateral:;elastic   Nipples Bilateral:;everted   Maternal Infant Feeding   Maternal Emotional State anxious;tense   Infant Positioning laid back (ventral)   Signs of Milk Transfer audible swallow;infant jaw motion present   Latch Assistance yes   Community Referrals   Community Referrals outpatient lactation program;pediatric care provider;support group

## 2024-06-13 PROCEDURE — 25000003 PHARM REV CODE 250

## 2024-06-13 PROCEDURE — 11000001 HC ACUTE MED/SURG PRIVATE ROOM

## 2024-06-13 PROCEDURE — 99232 SBSQ HOSP IP/OBS MODERATE 35: CPT | Mod: ,,, | Performed by: ADVANCED PRACTICE MIDWIFE

## 2024-06-13 PROCEDURE — 25000003 PHARM REV CODE 250: Performed by: GENERAL PRACTICE

## 2024-06-13 RX ADMIN — IBUPROFEN 600 MG: 600 TABLET, FILM COATED ORAL at 11:06

## 2024-06-13 RX ADMIN — DOCUSATE SODIUM 200 MG: 100 CAPSULE, LIQUID FILLED ORAL at 08:06

## 2024-06-13 RX ADMIN — IBUPROFEN 600 MG: 600 TABLET, FILM COATED ORAL at 05:06

## 2024-06-13 RX ADMIN — ACETAMINOPHEN 650 MG: 325 TABLET, FILM COATED ORAL at 11:06

## 2024-06-13 RX ADMIN — ACETAMINOPHEN 650 MG: 325 TABLET, FILM COATED ORAL at 05:06

## 2024-06-13 RX ADMIN — PRENATAL VIT W/ FE FUMARATE-FA TAB 27-0.8 MG 1 TABLET: 27-0.8 TAB at 08:06

## 2024-06-13 RX ADMIN — OXYCODONE HYDROCHLORIDE 5 MG: 5 TABLET ORAL at 12:06

## 2024-06-13 NOTE — PROGRESS NOTES
[facility] ([facility])  Vaginal Delivery Progress Note  Obstetrics    SUBJECTIVE:     Radha Callaway is a 34 y.o. female PPD #1 status post Spontaneous vaginal delivery at 40w0d in a pregnancy complicated by MTHFR gene mutation and needle phobia. Patient is doing well this morning. She denies nausea, vomiting, fever or chills. Patient reports mild abdominal pain that is well relieved by oral pain medications. Lochia is moderate and decreasing. Patient is voiding without difficulty and ambulating with no difficulty. She has passed flatus and has had a BM. Patient does plan to breast feed. ? for contraception. She desires circumcision.    OBJECTIVE:     Vital Signs Ranges:  Temp:  [97.4 °F (36.3 °C)-98.3 °F (36.8 °C)] 97.4 °F (36.3 °C)  Pulse:  [77-95] 80  Resp:  [16-18] 18  SpO2:  [96 %-100 %] 97 %  BP: (127-163)/(69-96) 127/80    I/O (Last 24H):    Intake/Output Summary (Last 24 hours) at 2024 0901  Last data filed at 2024 1300  Gross per 24 hour   Intake --   Output 1400 ml   Net -1400 ml       Physical Exam:  General:    alert, appears stated age, and cooperative   Lungs:  Normal effort   Heart:  regular rate and rhythm, S1, S2 normal, no murmur, click, rub or gallop, regular rate and rhythm, and not examined   Abdomen:  normal findings: soft, non-tender   Uterine Size:  firm located at 1FB below umbilicus.   Incision:   N/A   Extremities:   peripheral pulses normal, no pedal edema, no clubbing or cyanosis, no pedal edema noted     [facility]:   [facility]    ASSESSMENT:     Assessment:  Active Hospital Problems    Diagnosis    * (spontaneous vaginal delivery)    Encounter for induction of labor      PLAN:     Plan:  1. Postpartum care:   - Patient doing well. Continue routine management and advances.   - Continue PO pain meds. Pain well controlled.   - Post : H/h . Not done   - Encourage ambulation   - Circumcision yes   - Contraception?   - Lactation breast and bottle    2. PP Day #1  Stable       Breastfeeding without difficulty    Disposition: As patient meets milestones, will plan to discharge home tomorrow.

## 2024-06-13 NOTE — ANESTHESIA POSTPROCEDURE EVALUATION
Anesthesia Post Evaluation    Patient: Radha Callaway    Procedure(s) Performed: * No procedures listed *    Final Anesthesia Type: epidural      Patient location during evaluation: labor & delivery  Patient participation: Yes- Able to Participate  Level of consciousness: awake and alert  Post-procedure vital signs: reviewed and stable  Pain management: adequate  Airway patency: patent  MATT mitigation strategies: Use of major conduction anesthesia (spinal/epidural) or peripheral nerve block  PONV status at discharge: No PONV  Anesthetic complications: no      Cardiovascular status: blood pressure returned to baseline  Respiratory status: unassisted  Hydration status: euvolemic  Follow-up not needed.              Vitals Value Taken Time   /80 06/13/24 0759   Temp 36.3 °C (97.4 °F) 06/13/24 0759   Pulse 80 06/13/24 0759   Resp 18 06/13/24 0759   SpO2 97 % 06/13/24 0759         No case tracking events are documented in the log.      Pain/Marichuy Score: Pain Rating Prior to Med Admin: 6 (6/13/2024  5:10 AM)  Pain Rating Post Med Admin: 0 (6/13/2024  1:30 AM)

## 2024-06-13 NOTE — PROGRESS NOTES
"RN notified Dr. Cox of severe range blood pressures. Rn explained to MD that pt was visibly emotional and states she " was fighting and upset with her sister." MD discussed with care team and gave orders to recheck BP in one hour. One hour recheck WNL.   "

## 2024-06-14 ENCOUNTER — TELEPHONE (OUTPATIENT)
Dept: OBSTETRICS AND GYNECOLOGY | Facility: CLINIC | Age: 34
End: 2024-06-14
Payer: MEDICAID

## 2024-06-14 VITALS
SYSTOLIC BLOOD PRESSURE: 144 MMHG | BODY MASS INDEX: 36.8 KG/M2 | HEIGHT: 65 IN | OXYGEN SATURATION: 99 % | HEART RATE: 92 BPM | RESPIRATION RATE: 18 BRPM | WEIGHT: 220.88 LBS | TEMPERATURE: 98 F | DIASTOLIC BLOOD PRESSURE: 88 MMHG

## 2024-06-14 PROCEDURE — 99238 HOSP IP/OBS DSCHRG MGMT 30/<: CPT | Mod: ,,, | Performed by: OBSTETRICS & GYNECOLOGY

## 2024-06-14 PROCEDURE — 25000003 PHARM REV CODE 250: Performed by: GENERAL PRACTICE

## 2024-06-14 PROCEDURE — 25000003 PHARM REV CODE 250

## 2024-06-14 RX ORDER — NIFEDIPINE 30 MG/1
30 TABLET, EXTENDED RELEASE ORAL DAILY
Status: DISCONTINUED | OUTPATIENT
Start: 2024-06-14 | End: 2024-06-14 | Stop reason: HOSPADM

## 2024-06-14 RX ORDER — DOCUSATE SODIUM 100 MG/1
100 CAPSULE, LIQUID FILLED ORAL 3 TIMES DAILY PRN
Qty: 60 CAPSULE | Refills: 0 | Status: SHIPPED | OUTPATIENT
Start: 2024-06-14 | End: 2024-06-18

## 2024-06-14 RX ORDER — IBUPROFEN 600 MG/1
600 TABLET ORAL EVERY 6 HOURS
Qty: 60 TABLET | Refills: 0 | Status: SHIPPED | OUTPATIENT
Start: 2024-06-14 | End: 2024-06-18

## 2024-06-14 RX ORDER — NIFEDIPINE 30 MG/1
30 TABLET, EXTENDED RELEASE ORAL DAILY
Qty: 30 TABLET | Refills: 0 | Status: SHIPPED | OUTPATIENT
Start: 2024-06-14 | End: 2024-07-14

## 2024-06-14 RX ADMIN — IBUPROFEN 600 MG: 600 TABLET, FILM COATED ORAL at 05:06

## 2024-06-14 RX ADMIN — PRENATAL VIT W/ FE FUMARATE-FA TAB 27-0.8 MG 1 TABLET: 27-0.8 TAB at 09:06

## 2024-06-14 RX ADMIN — ACETAMINOPHEN 650 MG: 325 TABLET, FILM COATED ORAL at 12:06

## 2024-06-14 RX ADMIN — OXYCODONE HYDROCHLORIDE 5 MG: 5 TABLET ORAL at 12:06

## 2024-06-14 RX ADMIN — DOCUSATE SODIUM 200 MG: 100 CAPSULE, LIQUID FILLED ORAL at 09:06

## 2024-06-14 RX ADMIN — IBUPROFEN 600 MG: 600 TABLET, FILM COATED ORAL at 12:06

## 2024-06-14 RX ADMIN — NIFEDIPINE 30 MG: 30 TABLET, FILM COATED, EXTENDED RELEASE ORAL at 07:06

## 2024-06-14 RX ADMIN — IBUPROFEN 600 MG: 600 TABLET, FILM COATED ORAL at 06:06

## 2024-06-14 RX ADMIN — ACETAMINOPHEN 650 MG: 325 TABLET, FILM COATED ORAL at 06:06

## 2024-06-14 NOTE — DISCHARGE SUMMARY
Delivery Discharge Summary  Obstetrics      Primary OB Clinician: Marissa Peterson MD      Admission date: 2024  Discharge date: 2024    Disposition: To home, self care    Discharge Diagnosis List:      Patient Active Problem List   Diagnosis    MTHFR gene mutation    History of multiple miscarriages    Encounter for induction of labor     (spontaneous vaginal delivery)       Procedure:     Hospital Course:  Radha Callaway is a 34 y.o. now , PPD #2 who was admitted on 2024 at 40w0d for IOL. Patient was subsequently admitted to labor and delivery unit with signed consents.     Labor course was uncomplicated and resulted in  without complications.       Please see delivery note for further details. Her postpartum course was complicated by gHTN for which she was started on Propcardia XL 30 on PPD#2 with good control of BP. On discharge day, patient's pain is controlled with oral pain medications. Pt is tolerating ambulation without SOB or CP, and regular diet without N/V. Reports lochia is mild. Denies any HA, vision changes, F/C, LE swelling. Denies any breast pain/soreness.    Pt in stable condition and ready for discharge. She has been instructed to start and/or continue medications and follow up with her obstetrics provider as listed below.    Pertinent studies:  CBC  Recent Labs   Lab 24  0134   WBC 10.69   HGB 12.0   HCT 35.5*   MCV 90             Immunization History   Administered Date(s) Administered    Hepatitis B, Pediatric/Adolescent 03/10/2000, 03/10/2000, 2001, 2001, 2005, 2005    PPD Test 2005    Tdap 2006, 2024    Varicella 2001        Delivery:    Episiotomy: None   Lacerations: 1st   Repair suture:     Repair # of packets: 1   Blood loss (ml):       Birth information:  YOB: 2024   Time of birth: 12:37 AM   Sex: male   Delivery type: Vaginal, Spontaneous   Gestational Age: 40w0d     Measurements  "   Weight: 3450 g  Weight (lbs): 7 lb 9.7 oz  Length: 50.8 cm  Length (in): 20"  Head circumference: 30.5 cm  Chest circumference: 34.3 cm         Delivery Clinician: Delivery Providers    Delivering clinician: Marissa Peterson MD   Provider Role    Steff Cox MD Resident    Princess Ann RN Registered Nurse    Debbi Drew RN Registered Nurse    Megan Cesar RN Charge Nurse    JohnnyChris smith, ST Surgical Tech             Additional  information:  Forceps:    Vacuum:    Breech:    Observed anomalies      Living?:     Apgars    Living status: Living  Apgar Component Scores:  1 min.:  5 min.:  10 min.:  15 min.:  20 min.:    Skin color:  0  1       Heart rate:  2  2       Reflex irritability:  1  2       Muscle tone:  2  2       Respiratory effort:  2  2       Total:  7  9       Apgars assigned by: KAVIN DREW         Placenta: Delivered:       appearance    Patient Instructions:   Current Discharge Medication List        START taking these medications    Details   docusate sodium (COLACE) 100 MG capsule Take 1 capsule (100 mg total) by mouth 3 (three) times daily as needed for Constipation.  Qty: 60 capsule, Refills: 0      ibuprofen (ADVIL,MOTRIN) 600 MG tablet Take 1 tablet (600 mg total) by mouth every 6 (six) hours.  Qty: 60 tablet, Refills: 0      NIFEdipine (PROCARDIA-XL) 30 MG (OSM) 24 hr tablet Take 1 tablet (30 mg total) by mouth once daily.  Qty: 30 tablet, Refills: 0    Comments: .           CONTINUE these medications which have NOT CHANGED    Details   folic acid (FOLVITE) 400 MCG tablet Take 800 mcg by mouth.      [id number]-algal oil (METANX/FOLTANX RF) 3 mg-35 mg-2 mg -90.314 mg Cap Take 1 capsule by mouth once daily.      prenatal vit no.130-iron-folic (PRENATAL VITAMIN) 27 mg iron- 800 mcg Tab Take 1 tablet by mouth once daily.      SE- 19 CHEWABLE 29 mg iron- 1 mg Chew Take 1 tablet by mouth.           STOP taking these medications       aspirin 81 MG Chew Comments:   Reason " for Stopping:               Discharge Procedure Orders   BREAST PUMP FOR HOME USE     Order Specific Question Answer Comments   Type of pump: Electric    Weight: 100.2 kg (220 lb 14.4 oz)    Length of need (1-99 months): 99             Lisset Manriquez MD   PGY-4, OB-GYN

## 2024-06-14 NOTE — TELEPHONE ENCOUNTER
Calling patient to schedule bp check next week. Appt time provided. Agrees date and time will work.

## 2024-06-14 NOTE — NURSING
The following message for sent to dr angel's staff:   Hi, can you please call ms macdonald to schedule a 2 week post partum mood check appt.   Pt was asked to check bp3x/day@home w/personal cuffs,she does NOT have a connect mom,&message md w/ bp readings, was asked to call md/jerica per bp parameters or for s/s of pre-E. If dr angel would like to see her in for office for a bp check, can you please call pt to schedule an appt for Monday 6/17. Thank you

## 2024-06-14 NOTE — PROGRESS NOTES
POSTPARTUM PROGRESS NOTE    Subjective:     PPD#: 2   Procedure:    EGA: 40w0d   N/V: No   F/C: No   Abd Pain: Mild, moderately-controlled with oral pain medication   Lochia: Mild   Voiding: Yes   Ambulating: Yes   Bowel fnc: Yes   Breastfeeding: Yes   Contraception: Per primary OB   Circumcision: Done   Patient denies headache, dizziness, visual changes, chest pain, shortness of breath, nausea or vomiting and right upper quadrant pain. Pt hesitant about starting antihypertensive medication, states she feels her elevated BP are due to stress.     Objective:      Temp:  [97.4 °F (36.3 °C)-98.5 °F (36.9 °C)] 98.1 °F (36.7 °C)  Pulse:  [] 85  Resp:  [18] 18  SpO2:  [97 %-99 %] 98 %  BP: (127-155)/(78-95) 152/91    Heart: Regular rate and rhythm   Lung: Normal respiratory effort   Abdomen: Soft, appropriately tender   Uterus: Firm, no fundal tenderness   : Deferred   Extremities: Bilateral trace edema     Lab Review    Recent Labs   Lab 24  0843   *   K 4.1      CO2 20*   BUN 13   CREATININE 0.7   GLU 79   PROT 6.0   BILITOT 0.1   ALKPHOS 188*   ALT 17   AST 22       Recent Labs   Lab 24  0134   WBC 10.69   HGB 12.0   HCT 35.5*   MCV 90            I/O  No intake or output data in the 24 hours ending 24 0723     Assessment and Plan:   Radha Callaway is a 34 y.o. yo  s/p  PPD #2 c/b gHTN, severe needle phobia, anxiety/depression    Postpartum care  -Patient doing well and meeting appropriate milestones  -Pain controlled  -Ambulating, voiding, alejadnro PO  -Breastfeeding w/o issues  -Baby at bedside, doing well  -Continue routine postpartum care     2.   gHTN  - Asymptomatic  - Labs as above  - Multiple high mild readings with SBPs in 150s, will start procardia 30XL tonight  - If well controlled throughout today will consider PM discharge  - Discussed 5 day course of lasix, pt hesitant to use additional medications    3. Needle phobia  - Mood stable  - Unmedicated  -  No IV access    4.   Anxiety and depression  - 2 week mood check postpartum    Wilma Alexandra MD  OB Hospitalist   Date and Time: 06/14/2024 7:23 AM

## 2024-06-14 NOTE — LACTATION NOTE
"   06/14/24 1500   Maternal Assessment   Breast Shape Left:;round   Breast Density Left:;soft   Areola Left:;elastic   Nipples Left:;everted   Maternal Infant Feeding   Maternal Emotional State anxious   Equipment Type   Breast Pump Type double electric, personal   Breast Pump Flange Type hard   Breast Pump Flange Size 21 mm   Breast Pumping   Breast Pumping Interventions frequent pumping encouraged   Community Referrals   Community Referrals outpatient lactation program;WIC (women, infants and children) program;support group     Pt reports that she would like to mostly pump and bottle feed baby ebm. Pt is "ok" with breastfeeding baby at the breast for bonding. Discharge lactation education provided. Questions answered.pt's questions mostly about pumping and bottle feeding baby. pt given breastpump prescription,[place] breastp[place] request [place], dme list, pumping long,wic pacify information and community resources sheet.  Pt has lactation warmline number for further breastfeeding questions.  "

## 2024-06-14 NOTE — TELEPHONE ENCOUNTER
----- Message from July Hess RN sent at 6/14/2024  2:16 PM CDT -----  Hi, can you please call ms macdonald to schedule a 2 week post partum mood check appt.   Pt was asked to check bp3x/day@home w/personal cuffs,she does NOT have a connect mom,&message md w/ bp readings, was asked to call md/jerica per bp parameters or for s/s of pre-E. If dr angel would like to see her in for office for a bp check, can you please call pt to schedule an appt for Monday 6/17. Thank you

## 2024-06-14 NOTE — PLAN OF CARE
Pt will identify early hunger cues and respond.   Pt will breastfeed or bottle feed ebm to baby on cue, or about 8 or more in 24 hours.  Pt will observe adequate milk transfer- audible/ visual swallows, enough wet and dirty diaper, satisfaction cues and hydration status.   Pt will give expressed breast milk if latch is not yet fully efficient.   Pt will call for further help as needed- LC number on the board or lactation warm line if pt have been discharged.

## 2024-06-14 NOTE — PLAN OF CARE
Problem: Adult Inpatient Plan of Care  Goal: Plan of Care Review  Outcome: Met  Goal: Patient-Specific Goal (Individualized)  Outcome: Met  Goal: Absence of Hospital-Acquired Illness or Injury  Outcome: Met  Goal: Optimal Comfort and Wellbeing  Outcome: Met  Goal: Readiness for Transition of Care  Outcome: Met     Problem:  Fall Injury Risk  Goal: Absence of Fall, Infant Drop and Related Injury  Outcome: Met     Problem: Infection  Goal: Absence of Infection Signs and Symptoms  Outcome: Met     Problem: Fall Injury Risk  Goal: Absence of Fall and Fall-Related Injury  Outcome: Met     Problem: Fatigue  Goal: Improved Activity Tolerance  Outcome: Met     Problem: Pain Acute  Goal: Optimal Pain Control and Function  Outcome: Met     Problem: Skin Injury Risk Increased  Goal: Skin Health and Integrity  Outcome: Met     Problem: Breastfeeding  Goal: Effective Breastfeeding  Outcome: Met     Problem: Hypertensive Disorders in Pregnancy  Goal: Patient-Fetal Stabilization  Outcome: Met     Problem: Postpartum (Vaginal Delivery)  Goal: Successful Parent Role Transition  Outcome: Met  Goal: Hemostasis  Outcome: Met  Goal: Absence of Infection Signs and Symptoms  Outcome: Met  Goal: Anesthesia/Sedation Recovery  Outcome: Met  Goal: Optimal Pain Control and Function  Outcome: Met  Goal: Effective Urinary Elimination  Outcome: Met

## 2024-06-16 ENCOUNTER — TELEPHONE (OUTPATIENT)
Dept: OBSTETRICS AND GYNECOLOGY | Facility: OTHER | Age: 34
End: 2024-06-16
Payer: MEDICAID

## 2024-06-18 ENCOUNTER — POSTPARTUM VISIT (OUTPATIENT)
Dept: OBSTETRICS AND GYNECOLOGY | Facility: CLINIC | Age: 34
End: 2024-06-18
Payer: MEDICAID

## 2024-06-18 VITALS
HEIGHT: 65 IN | WEIGHT: 202.63 LBS | BODY MASS INDEX: 33.76 KG/M2 | SYSTOLIC BLOOD PRESSURE: 142 MMHG | DIASTOLIC BLOOD PRESSURE: 98 MMHG

## 2024-06-18 DIAGNOSIS — Z86.59 HISTORY OF ANXIETY: ICD-10-CM

## 2024-06-18 DIAGNOSIS — O13.9 GESTATIONAL HYPERTENSION, ANTEPARTUM: Primary | ICD-10-CM

## 2024-06-18 PROCEDURE — 99213 OFFICE O/P EST LOW 20 MIN: CPT | Mod: PBBFAC,TH | Performed by: STUDENT IN AN ORGANIZED HEALTH CARE EDUCATION/TRAINING PROGRAM

## 2024-06-18 PROCEDURE — 1111F DSCHRG MED/CURRENT MED MERGE: CPT | Mod: CPTII,,, | Performed by: STUDENT IN AN ORGANIZED HEALTH CARE EDUCATION/TRAINING PROGRAM

## 2024-06-18 PROCEDURE — 99212 OFFICE O/P EST SF 10 MIN: CPT | Mod: S$PBB,TH,, | Performed by: STUDENT IN AN ORGANIZED HEALTH CARE EDUCATION/TRAINING PROGRAM

## 2024-06-18 PROCEDURE — 99999 PR PBB SHADOW E&M-EST. PATIENT-LVL III: CPT | Mod: PBBFAC,,, | Performed by: STUDENT IN AN ORGANIZED HEALTH CARE EDUCATION/TRAINING PROGRAM

## 2024-06-18 NOTE — LETTER
June 18, 2024    Radha Callaway  [address]  [place] [address]         [facility]  [address]  [place] [address]  Phone: [phone] June 18, 2024     Patient: Radha Callaway   YOB: 1990   Date of Visit: 6/18/2024       To Whom It May Concern:    It is my medical opinion that Radha Callaway  should remain on pelvic rest due to elevated blood pressure levels .    If you have any questions, concerns, or if I can be of further assistance to you please, do not hesitate to contact my office.    Sincerely,    Marissa Peterson MD

## 2024-07-01 ENCOUNTER — TELEPHONE (OUTPATIENT)
Dept: OBSTETRICS AND GYNECOLOGY | Facility: CLINIC | Age: 34
End: 2024-07-01
Payer: MEDICAID

## 2024-07-01 NOTE — TELEPHONE ENCOUNTER
Pt states she was monitoring Bps after some elevated readings in the hospital.  Says all elevated readings were when she was hormonal, crying and screaming at her sister.  Bps tend to be elevated when she is upset.  Was rx'd Procardia but told to monitor BP over the past 1-1.5 weeks.     These have been Bps over the past week without medication starting with today  117/87  124/87  134/91  123/85  121/86  117/88  128/92  123/93    With the Bps that were slightly elevated she was asymptomatic.  The ones that were slightly elevated were after cleaning house or doing some type of work.  Asking if she needs to be on Procardia or OK to stay off?  Please advise.  Would you like her to continue monitoring Bps or come in for BP check?     Also asked if she should go to the gym and do things like hot yoga.  Advised she should avoid exercise until her 6 week PP appt.  Can take a walk/stroll around the block but shouldn't be strenuous.

## 2024-07-01 NOTE — TELEPHONE ENCOUNTER
If these BP readings were off procardia, then she can stay off procardia. She can continue monitoring BP at least once per day and if 150/100s, to let me know. Agree with avoiding exercise other than very low-impact until cleared at her 6 wk PP visit. Thanks!

## 2024-07-22 ENCOUNTER — POSTPARTUM VISIT (OUTPATIENT)
Dept: OBSTETRICS AND GYNECOLOGY | Facility: CLINIC | Age: 34
End: 2024-07-22
Payer: MEDICAID

## 2024-07-22 VITALS
WEIGHT: 192.44 LBS | SYSTOLIC BLOOD PRESSURE: 112 MMHG | HEIGHT: 65 IN | DIASTOLIC BLOOD PRESSURE: 70 MMHG | BODY MASS INDEX: 32.06 KG/M2

## 2024-07-22 DIAGNOSIS — N89.8 VAGINAL DISCHARGE: ICD-10-CM

## 2024-07-22 DIAGNOSIS — Z86.79 HISTORY OF POSTPARTUM GESTATIONAL HYPERTENSION: ICD-10-CM

## 2024-07-22 DIAGNOSIS — Z87.59 HISTORY OF POSTPARTUM GESTATIONAL HYPERTENSION: ICD-10-CM

## 2024-07-22 DIAGNOSIS — Z30.011 ENCOUNTER FOR INITIAL PRESCRIPTION OF CONTRACEPTIVE PILLS: ICD-10-CM

## 2024-07-22 PROBLEM — J45.909 ASTHMA: Status: ACTIVE | Noted: 2024-07-22

## 2024-07-22 PROCEDURE — 99999 PR PBB SHADOW E&M-EST. PATIENT-LVL III: CPT | Mod: PBBFAC,,, | Performed by: STUDENT IN AN ORGANIZED HEALTH CARE EDUCATION/TRAINING PROGRAM

## 2024-07-22 PROCEDURE — 99213 OFFICE O/P EST LOW 20 MIN: CPT | Mod: PBBFAC | Performed by: STUDENT IN AN ORGANIZED HEALTH CARE EDUCATION/TRAINING PROGRAM

## 2024-07-22 PROCEDURE — 81514 NFCT DS BV&VAGINITIS DNA ALG: CPT | Performed by: STUDENT IN AN ORGANIZED HEALTH CARE EDUCATION/TRAINING PROGRAM

## 2024-07-22 PROCEDURE — 0503F POSTPARTUM CARE VISIT: CPT | Mod: CPTII,,, | Performed by: STUDENT IN AN ORGANIZED HEALTH CARE EDUCATION/TRAINING PROGRAM

## 2024-07-22 RX ORDER — DROSPIRENONE 4 MG/1
4 TABLET, FILM COATED ORAL DAILY
Qty: 90 TABLET | Refills: 3 | Status: SHIPPED | OUTPATIENT
Start: 2024-07-22 | End: 2025-07-22

## 2024-07-22 NOTE — PROGRESS NOTES
"CC: Postpartum Visit    Subjective:      Radha Callaway is a 34 y.o.  who presents for a postpartum visit.  She is status post  uncomplicated vaginal delivery 6 weeks ago.  Complicated by GHTN - was placed on procardia 30 daily postpartum, which was discontinued 3 weeks ago. C/O vaginal discharge and odor today. No other complaints.  She is pumping and formula feeding.  She desires oral progesterone-only contraceptive for contraception.  She denies signs and symptoms of postpartum depression.    Her last pap was NILM, -HPV on 2022      Review the Delivery Report for details.     Objective:     /70 (BP Location: Left arm, Patient Position: Sitting, BP Method: Medium (Manual))   Ht 5' 5" (1.651 m)   Wt 87.3 kg (192 lb 7.4 oz)   LMP  (LMP Unknown)   Breastfeeding Yes   BMI 32.03 kg/m²     General: healthy, alert, no distress, smiling  Abdomen: Normal, benign.  External Genitalia: normal, well-healed, without lesions or masses  Vagina: normal, well-healed, mucus like discharge, without lesions  Bimanual: small mobile non-tender uterus     Assessment:     Encounter for postpartum visit    Vaginal discharge  -     Vaginosis Screen by DNA Probe    Encounter for initial prescription of contraceptive pills  -     drospirenone, contraceptive, (SLYND) 4 mg (28) Tab; Take 1 tablet (4 mg total) by mouth once daily.  Dispense: 90 tablet; Refill: 3    History of postpartum gestational hypertension        Plan:     - pelvic exam as above, affirm collected  - perineum well healed, released from pelvic rest  - BP normotensive without medication  - discussed progesterone only options for contraception - desires Slynd, rx to pharmacy   - return to clinic in 3-4 months for annual exam  "

## 2024-07-23 LAB
BACTERIAL VAGINOSIS DNA: NEGATIVE
CANDIDA GLABRATA DNA: NEGATIVE
CANDIDA KRUSEI DNA: NEGATIVE
CANDIDA RRNA VAG QL PROBE: NEGATIVE
T VAGINALIS RRNA GENITAL QL PROBE: NEGATIVE

## 2024-08-13 ENCOUNTER — POSTPARTUM VISIT (OUTPATIENT)
Dept: OBSTETRICS AND GYNECOLOGY | Facility: CLINIC | Age: 34
End: 2024-08-13
Payer: MEDICAID

## 2024-08-13 VITALS
BODY MASS INDEX: 31.22 KG/M2 | SYSTOLIC BLOOD PRESSURE: 120 MMHG | DIASTOLIC BLOOD PRESSURE: 70 MMHG | WEIGHT: 187.38 LBS | HEIGHT: 65 IN

## 2024-08-13 DIAGNOSIS — Z11.51 SCREENING FOR HPV (HUMAN PAPILLOMAVIRUS): ICD-10-CM

## 2024-08-13 DIAGNOSIS — Z12.4 CERVICAL CANCER SCREENING: ICD-10-CM

## 2024-08-13 DIAGNOSIS — N92.6 IRREGULAR MENSES: Primary | ICD-10-CM

## 2024-08-13 DIAGNOSIS — N93.0 POSTCOITAL BLEEDING: Primary | ICD-10-CM

## 2024-08-13 PROCEDURE — 99213 OFFICE O/P EST LOW 20 MIN: CPT | Mod: PBBFAC | Performed by: STUDENT IN AN ORGANIZED HEALTH CARE EDUCATION/TRAINING PROGRAM

## 2024-08-13 PROCEDURE — 99999 PR PBB SHADOW E&M-EST. PATIENT-LVL III: CPT | Mod: PBBFAC,,, | Performed by: STUDENT IN AN ORGANIZED HEALTH CARE EDUCATION/TRAINING PROGRAM

## 2024-08-13 PROCEDURE — 88175 CYTOPATH C/V AUTO FLUID REDO: CPT | Performed by: PATHOLOGY

## 2024-08-13 PROCEDURE — 87624 HPV HI-RISK TYP POOLED RSLT: CPT | Performed by: STUDENT IN AN ORGANIZED HEALTH CARE EDUCATION/TRAINING PROGRAM

## 2024-08-13 PROCEDURE — 99213 OFFICE O/P EST LOW 20 MIN: CPT | Mod: S$PBB,,, | Performed by: STUDENT IN AN ORGANIZED HEALTH CARE EDUCATION/TRAINING PROGRAM

## 2024-08-13 PROCEDURE — 88141 CYTOPATH C/V INTERPRET: CPT | Mod: ,,, | Performed by: PATHOLOGY

## 2024-08-13 NOTE — PROGRESS NOTES
"Chief Complaint: Vaginal bleeding     HPI:      Radha Callaway is a 34 y.o.  who presents today for vaginal bleeding. On first pack of Slynd for birth control.  .  She reports she had intercourse 5 days ago and has had bright red bleeding since that time. Moderate to light flow. Denies pelvic pain, abnormal discharge or itching. Continues to notice an odor that she did not notice prior to pregnancy. Continuing to breast and formula feed. She has no other complaints today.       Physical Exam:      PHYSICAL EXAM:  /70 (BP Location: Left arm, Patient Position: Sitting, BP Method: Medium (Manual))   Ht 5' 5" (1.651 m)   Wt 85 kg (187 lb 6.3 oz)   LMP  (LMP Unknown)   Breastfeeding Yes   BMI 31.18 kg/m²   Body mass index is 31.18 kg/m².     APPEARANCE: Well nourished, well developed, in no acute distress.  PELVIC:  External genitalia and urethra within normal limits. Vagina without lesions, with normal appearing discharge, without erythema, without ulcers.  Cervix without cervical motion tenderness, non-friable. Uterus: normal size, mobile, non-tender.  No adnexal masses or tenderness palpated.      Assessment/Plan:     Postcoital bleeding    Cervical cancer screening  -     Liquid-Based Pap Smear, Screening    Screening for HPV (human papillomavirus)  -     HPV High Risk Genotypes, PCR      - recent affirm negative, no sign of infection on exam, pap/hpv collected due to postcoital bleeding episode  - recommend continuing Slynd as it may take a few months for body to adjust  - also dicussed other birth control options - she would like to stick with Slynd for now  - rtc when due for annual or sooner if needed  "

## 2024-08-19 LAB
FINAL PATHOLOGIC DIAGNOSIS: NORMAL
Lab: NORMAL

## 2024-11-20 ENCOUNTER — TELEPHONE (OUTPATIENT)
Dept: OBSTETRICS AND GYNECOLOGY | Facility: CLINIC | Age: 34
End: 2024-11-20
Payer: MEDICAID

## 2024-11-20 NOTE — TELEPHONE ENCOUNTER
Pt has been on Slynd since 6 week PP appt.  Last 3 weeks has been bleeding daily needing pad/tampon.  Not heavy, like the flow of a regular period.  Still breastfeeding.  Denies other vaginal complaints.  Recommended she take a UPT and call back with results.      Do you have any recommendations?  Can she double up until bleeding stops or should she just continue taking 1 pill daily?

## 2024-11-20 NOTE — TELEPHONE ENCOUNTER
Spoke with pt after talking to Dr. Peterson, suggested Ibuprofen 600mg with food BID x3 days.  She is currently 2 weeks from starting sugar pills.  Call back if bleeding gets heavy or positive UPT.

## 2024-12-12 ENCOUNTER — TELEPHONE (OUTPATIENT)
Dept: OBSTETRICS AND GYNECOLOGY | Facility: CLINIC | Age: 34
End: 2024-12-12
Payer: MEDICAID

## 2024-12-13 NOTE — TELEPHONE ENCOUNTER
Pt states she she delivered in June she has been having urinary frequency and incontinence.  Advised she likely has a weak pelvic floor and could start doing Kegel exercises.  Pt would like to discuss with Dr. Peterson.  Scheduled appt.

## 2024-12-16 NOTE — PROGRESS NOTES
"CC: Postpartum mood check visit    Subjective:      Radha Callaway is a 34 y.o.  who presents for a postpartum mood and BP check visit.  She is status post  uncomplicated vaginal delivery 1 week ago.  Developed GHTN postpartum, discharged home on procardia 30, has been compliant and asymptomatic.  She is breastfeeding.  She is undecided for contraception.  She denies signs and symptoms of postpartum depression.    Her last pap was NILM neg HPV on 2024      Review the Delivery Report for details.     Objective:     BP (!) 142/98   Ht 5' 5" (1.651 m)   Wt 91.9 kg (202 lb 9.6 oz)   Breastfeeding Yes   BMI 33.71 kg/m²     General: healthy, alert, no distress  Abdomen: Normal, benign.    Assessment:     Gestational hypertension, antepartum    History of anxiety        Plan:   - BP low mild range, asymptomatic, continue procardia 30  - PPD screen negative  - Continue pelvic rest  - RTC 6 wks    "

## 2024-12-18 ENCOUNTER — PATIENT MESSAGE (OUTPATIENT)
Dept: OBSTETRICS AND GYNECOLOGY | Facility: CLINIC | Age: 34
End: 2024-12-18

## 2024-12-18 ENCOUNTER — OFFICE VISIT (OUTPATIENT)
Dept: OBSTETRICS AND GYNECOLOGY | Facility: CLINIC | Age: 34
End: 2024-12-18
Payer: MEDICAID

## 2024-12-18 VITALS
WEIGHT: 185.19 LBS | HEIGHT: 65 IN | SYSTOLIC BLOOD PRESSURE: 120 MMHG | BODY MASS INDEX: 30.85 KG/M2 | DIASTOLIC BLOOD PRESSURE: 82 MMHG

## 2024-12-18 DIAGNOSIS — N89.8 VAGINAL DISCHARGE: Primary | ICD-10-CM

## 2024-12-18 DIAGNOSIS — N39.3 SUI (STRESS URINARY INCONTINENCE, FEMALE): ICD-10-CM

## 2024-12-18 DIAGNOSIS — R35.0 URINARY FREQUENCY: ICD-10-CM

## 2024-12-18 LAB
BACTERIA #/AREA URNS AUTO: ABNORMAL /HPF
BILIRUB SERPL-MCNC: NORMAL MG/DL
BILIRUB UR QL STRIP: NEGATIVE
BLOOD URINE, POC: NORMAL
CLARITY UR REFRACT.AUTO: ABNORMAL
CLARITY, POC UA: NORMAL
COLOR UR AUTO: YELLOW
COLOR, POC UA: NORMAL
GLUCOSE UR QL STRIP: NEGATIVE
GLUCOSE UR QL STRIP: NORMAL
HGB UR QL STRIP: NEGATIVE
KETONES UR QL STRIP: ABNORMAL
KETONES UR QL STRIP: NORMAL
LEUKOCYTE ESTERASE UR QL STRIP: ABNORMAL
LEUKOCYTE ESTERASE URINE, POC: NORMAL
MICROSCOPIC COMMENT: ABNORMAL
NITRITE UR QL STRIP: NEGATIVE
NITRITE, POC UA: NORMAL
NON-SQ EPI CELLS #/AREA URNS AUTO: 0 /HPF
PH UR STRIP: 6 [PH] (ref 5–8)
PH, POC UA: 5
PROT UR QL STRIP: ABNORMAL
PROTEIN, POC: NORMAL
RBC #/AREA URNS AUTO: 1 /HPF (ref 0–4)
SP GR UR STRIP: 1.02 (ref 1–1.03)
SPECIFIC GRAVITY, POC UA: 1.01
SQUAMOUS #/AREA URNS AUTO: 17 /HPF
URN SPEC COLLECT METH UR: ABNORMAL
UROBILINOGEN, POC UA: NORMAL
WBC #/AREA URNS AUTO: 7 /HPF (ref 0–5)

## 2024-12-18 PROCEDURE — 3008F BODY MASS INDEX DOCD: CPT | Mod: CPTII,,, | Performed by: STUDENT IN AN ORGANIZED HEALTH CARE EDUCATION/TRAINING PROGRAM

## 2024-12-18 PROCEDURE — 3074F SYST BP LT 130 MM HG: CPT | Mod: CPTII,,, | Performed by: STUDENT IN AN ORGANIZED HEALTH CARE EDUCATION/TRAINING PROGRAM

## 2024-12-18 PROCEDURE — 1160F RVW MEDS BY RX/DR IN RCRD: CPT | Mod: CPTII,,, | Performed by: STUDENT IN AN ORGANIZED HEALTH CARE EDUCATION/TRAINING PROGRAM

## 2024-12-18 PROCEDURE — 99213 OFFICE O/P EST LOW 20 MIN: CPT | Mod: S$PBB,,, | Performed by: STUDENT IN AN ORGANIZED HEALTH CARE EDUCATION/TRAINING PROGRAM

## 2024-12-18 PROCEDURE — 3079F DIAST BP 80-89 MM HG: CPT | Mod: CPTII,,, | Performed by: STUDENT IN AN ORGANIZED HEALTH CARE EDUCATION/TRAINING PROGRAM

## 2024-12-18 PROCEDURE — 81001 URINALYSIS AUTO W/SCOPE: CPT | Performed by: STUDENT IN AN ORGANIZED HEALTH CARE EDUCATION/TRAINING PROGRAM

## 2024-12-18 PROCEDURE — 87086 URINE CULTURE/COLONY COUNT: CPT | Performed by: STUDENT IN AN ORGANIZED HEALTH CARE EDUCATION/TRAINING PROGRAM

## 2024-12-18 PROCEDURE — 99213 OFFICE O/P EST LOW 20 MIN: CPT | Mod: PBBFAC | Performed by: STUDENT IN AN ORGANIZED HEALTH CARE EDUCATION/TRAINING PROGRAM

## 2024-12-18 PROCEDURE — 99999PBSHW POCT URINE DIPSTICK WITHOUT MICROSCOPE: Mod: PBBFAC,,,

## 2024-12-18 PROCEDURE — 1159F MED LIST DOCD IN RCRD: CPT | Mod: CPTII,,, | Performed by: STUDENT IN AN ORGANIZED HEALTH CARE EDUCATION/TRAINING PROGRAM

## 2024-12-18 PROCEDURE — 0352U VAGINOSIS SCREEN BY DNA PROBE: CPT | Performed by: STUDENT IN AN ORGANIZED HEALTH CARE EDUCATION/TRAINING PROGRAM

## 2024-12-18 PROCEDURE — 81002 URINALYSIS NONAUTO W/O SCOPE: CPT | Mod: PBBFAC | Performed by: STUDENT IN AN ORGANIZED HEALTH CARE EDUCATION/TRAINING PROGRAM

## 2024-12-18 PROCEDURE — 87591 N.GONORRHOEAE DNA AMP PROB: CPT | Performed by: STUDENT IN AN ORGANIZED HEALTH CARE EDUCATION/TRAINING PROGRAM

## 2024-12-18 PROCEDURE — 99999 PR PBB SHADOW E&M-EST. PATIENT-LVL III: CPT | Mod: PBBFAC,,, | Performed by: STUDENT IN AN ORGANIZED HEALTH CARE EDUCATION/TRAINING PROGRAM

## 2024-12-18 NOTE — PROGRESS NOTES
"Chief Complaint: Urinary leakage      HPI:      aRdha Callaway is a 34 y.o.  who presents today for urinary leakage. Pt reports during pregnancy and since delivery she has random low volume urinary leakage. Worsens with coughing, jumping, sneezing. States her underwear are always "smelling like pee" due to small spurts of leakage throughout the day. Also reports urinary frequency the past week. Denies urgency or hematuria.    Pt also c/o mood changes, not enjoying her normal activities, irritable, feeling negative and not herself. Has been occurring on and off since delivery, but has been most noticeable over the past 2 weeks. She is taking Slynd for birth control. She is still breastfeeding, about 6 feedings per day. She denies feeling depressed, just not feeling her regular happy self.  Pt also c/o vaginal discharge with occasional odor. Used boric acid suppository x 1 with improvement.  No additional complaints today.    Physical Exam:      PHYSICAL EXAM:  /82   Ht 5' 5" (1.651 m)   Wt 84 kg (185 lb 3 oz)   LMP  (LMP Unknown)   Breastfeeding Yes   BMI 30.82 kg/m²   Body mass index is 30.82 kg/m².     APPEARANCE: Well nourished, well developed, in no acute distress.  PELVIC:  External genitalia and urethra within normal limits. Vagina without lesions, with white discharge,  without erythema, without ulcers. No cystocele.  Cervix without cervical motion tenderness, non-friable. Uterus: normal size, mobile, non-tender.  No adnexal masses or tenderness palpated.      Assessment/Plan:     Vaginal discharge  -     Vaginosis Screen by DNA Probe; Future; Expected date: 2024  -     C. trachomatis/N. gonorrhoeae by AMP DNA [facility]; Vagina    BRODY (stress urinary incontinence, female)  -     Ambulatory Referral/Consult to Physical Therapy; Future; Expected date: 2024    Urinary frequency  -     Urinalysis  -     POCT urine dipstick without microscope  -     Urine culture    - pelvic exam as " above, affirm and gc/ct collected  - discussed options for BRODY management, will try pelvic floor PT  - UA and urine culture today  - will switch to continuous pill for now and may try to wean breastfeeding, will kepe me updated on mood symptoms  - rtc when due for annual exam

## 2024-12-19 LAB
BACTERIA UR CULT: NORMAL
BACTERIA UR CULT: NORMAL

## 2024-12-21 LAB
BACTERIAL VAGINOSIS DNA: NOT DETECTED
C TRACH DNA SPEC QL NAA+PROBE: NOT DETECTED
CANDIDA GLABRATA/KRUSEI: NOT DETECTED
CANDIDA RRNA VAG QL PROBE: DETECTED
N GONORRHOEA DNA SPEC QL NAA+PROBE: NOT DETECTED
TRICHOMONAS VAGINALIS: NOT DETECTED

## 2024-12-23 DIAGNOSIS — B37.31 YEAST VAGINITIS: Primary | ICD-10-CM

## 2024-12-23 RX ORDER — FLUCONAZOLE 150 MG/1
TABLET ORAL
Qty: 2 TABLET | Refills: 0 | Status: SHIPPED | OUTPATIENT
Start: 2024-12-23

## 2024-12-27 ENCOUNTER — TELEPHONE (OUTPATIENT)
Dept: OBSTETRICS AND GYNECOLOGY | Facility: CLINIC | Age: 34
End: 2024-12-27
Payer: MEDICAID

## 2024-12-27 NOTE — TELEPHONE ENCOUNTER
Breastfeeding pt c/o coughing, sore throat, congestion, and temp 100.  Symptoms started 12/25.  Discussed safe medications with breastfeeding and hydration.  Recommended going to urgent care to test for flu.

## 2025-01-15 ENCOUNTER — TELEPHONE (OUTPATIENT)
Dept: OBSTETRICS AND GYNECOLOGY | Facility: CLINIC | Age: 35
End: 2025-01-15

## 2025-02-11 ENCOUNTER — TELEPHONE (OUTPATIENT)
Dept: OBSTETRICS AND GYNECOLOGY | Facility: CLINIC | Age: 35
End: 2025-02-11
Payer: MEDICAID

## 2025-02-11 DIAGNOSIS — Z30.011 ENCOUNTER FOR INITIAL PRESCRIPTION OF CONTRACEPTIVE PILLS: Primary | ICD-10-CM

## 2025-02-11 RX ORDER — NORETHINDRONE ACETATE AND ETHINYL ESTRADIOL 1MG-20(21)
1 KIT ORAL DAILY
Qty: 90 TABLET | Refills: 3 | Status: SHIPPED | OUTPATIENT
Start: 2025-02-11 | End: 2026-02-11

## 2025-02-21 ENCOUNTER — CLINICAL SUPPORT (OUTPATIENT)
Dept: REHABILITATION | Facility: HOSPITAL | Age: 35
End: 2025-02-21
Payer: MEDICAID

## 2025-02-21 DIAGNOSIS — N39.3 SUI (STRESS URINARY INCONTINENCE, FEMALE): ICD-10-CM

## 2025-02-21 DIAGNOSIS — R53.1 WEAKNESS: Primary | ICD-10-CM

## 2025-02-21 PROCEDURE — 97161 PT EVAL LOW COMPLEX 20 MIN: CPT | Performed by: PHYSICAL THERAPIST

## 2025-02-21 NOTE — PROGRESS NOTES
Outpatient Rehab    Physical Therapy Evaluation    Patient Name: Radha Callaway  MRN: [id number]  YOB: 1990  Today's Date: 2/21/2025    Therapy Diagnosis:   Encounter Diagnoses   Name Primary?    BRODY (stress urinary incontinence, female)     Weakness Yes     Physician: Marissa Peterson MD    Physician Orders: Eval and Treat  Medical Diagnosis: N39.3 (ICD-10-CM) - BRODY (stress urinary incontinence, female)     Visit # / Visits Authorized:  1 / 20   Date of Evaluation:  2/21/2025   Insurance Authorization Period: 12/31/25  Plan of Care Certification:  2/21/2025 to 5/21/25      Time In:   130  Time Out:  220  Total Time:   45 min  Total Billable Time: 45 min ([facility] PT eval)    Intake Outcome Measure for FOTO Survey    Therapist reviewed FOTO scores for Radha Callaway on 2/21/2025.   FOTO report - see Media section or FOTO account episode details.     Intake Score:  Intake at eval 2/21/25         Subjective     Pain     Patient reports a current pain level of 0/10. Pain at best is reported as 0/10. Pain at worst is reported as 0/10.   Clinical Progression (since onset): Stable         Bladder/Bowel Habits and Symptoms  Bladder Habits  Urine Stream: Normal  Bladder Emptying: Other (Comment)  Other Bladder Emptying Comment: Feels complete however has some drippage and/or leaking with stress.  Frequency of Bladder Urgency: Occasionally  Urinary Urge/Sensation: Strong  Ability to Delay Urination: 30 minutes  Daytime Frequency of Urination: Every hour or so  Nighttime Frequency of Urination: 0-1  Just in Case Voiding: No  Toilet Mapping: No  Subjective Urine Volume: Medium  Estimated Fluid Intake: Water, wheatgrass, Espresso    Urinary Symptoms  Urine Leakage Amount: Few drops  Frequency of Urinary Incontinence: Multiple times per day  Urinary Incontinence Aggravating Factors: Coughing, Laughing, Sneezing  Post Void Dribble: Yes  Able to Stop the Flow of Urine: Unsure    Bowel Habits  [place] Stool Form Scale:  3-4  Frequency of Bowel Movements: 1 time per day  Frequency of Bowel Urgency: Never  Bowel Straining/Pushing: Never  Bowel Incomplete Emptying: Never  Abdominal Fullness/Bloating: Never  Pain with Bowel Movements: Never  Splinting During Bowel Movements: Never  Constipation: Never  Hemorrhoids: Absent    Bowel Symptoms  Bowel Incontinence Issues: None    Bladder or Bowel Leakage Protection  Protection for Leakage: None        Sexual Function  Sexually Active: Yes  Sexual Partners: Male  Able to Achieve Orgasm: Yes  Vaginal Dryness: No    Treatment History  Treatments  Previously Received Treatments: No  Currently Receiving Treatments: No    Personal Factors  Details on the patient's current diet include: 4 times a day; healthy meals The patient's physical activity or sport participation includes: Weight training, cardio work, Hot [place], JuBRAINu On average, the number of days per week the patient engages in moderate to strenuous exercise, like a brisk walk, is 5 days. On average, the length of time per day that the patient engages in moderate to strenuous exercise is 60 min. Details on sleep habits include: SLeeping well Level of stress was reported to be Not at all.          Living Arrangements  Living Situation  Housing: Home independently  Living Arrangements: Family members        Employment  Patient reports: Does the patient's condition impact their ability to work?  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    Radha Callaway  has no past surgical history on file.    aRdha has a current medication list which includes the following prescription(s): fluconazole, norethindrone-ethinyl estradiol, prenatal vitamin, and se-riddhi 19 chewable.    Review of patient's  allergies indicates:  No Known Allergies     Objective   Pelvic External Observations  Consent for Examination: Yes, Chaperone Present: Declines chaperone    Abdominal Assessment  Abdominal Scarring: No  Diastasis Rectus Abdominis Present: No  Transversus Abdominis Contraction: Present    Pelvic Assessment  Perineal Body Resting Position: Normal  Perineal Descent Bearing Down: Present  Volitional Contraction: Present  Volitional Relaxation: Present  Volitional Bearing Down: Present  Right Anal [place]: Present  Left Anal [place]: Present          Pelvic Floor Palpation  Pelvic Floor Exams Performed: External Pelvic and Internal Vaginal                       Right Internal Pelvic Floor and Rectal Palpation  Abnormal: Layer 1  Right Pelvic  1 Internal Palpation Observations: Pt with fair power at 4-/5; limited endurance at 5-6 seconds. Pt with fair quick flicks at 5 reps       Left Internal Pelvic Floor and Rectal Palpation  Abnormal: Layer 1  Left Pelvic  1 Internal Palpation Observations: Pt with fair power at 4-/5; limited endurance at 5-6 seconds. Pt with fair quick flicks at 5 reps           Pelvic Floor Special Tests  Single Digit Intravaginal Assessment  Intravaginal Pelvic Floor Strength: 4  Intravaginal Pelvic Floor Endurance (sec): 6  Intravaginal Pelvic Floor Repetitions: 5  Intravaginal Pelvic Floor Quick Flicks: 5  Intravaginal Pelvic Floor Co-Contraction Substitution: Absent  Intravaginal Pelvic Floor Relaxation Response: Normal  Anterior Vaginal Wall Laxity: WNL  Posterior Vaginal Wall Laxity: WNL  Right Vaginal Sensation: Intact             Treatment:   HEP as given in HEP    Assessment & Plan   Assessment  Radha presents with a condition of Low complexity.   Presentation of Symptoms: Stable       Functional Limitations: Other (Comment)  Other Functional Limitations: Urinary incontinence  Impairments: Abnormal muscle firing, Abnormal muscle tone, Impaired physical  strength    Prognosis: Good    Plan  From a physical therapy perspective, the patient would benefit from: Skilled Rehab Services    Planned therapy interventions include: Therapeutic exercise, Therapeutic activities, Neuromuscular re-education, Manual therapy, and ADLs/IADLs.    Planned modalities to include: Biofeedback.        Visit Frequency: 1 times Per Week for 12 Weeks.       This plan was discussed with Patient.   Discussion participants: Agreed Upon Plan of Care             Patient's spiritual, cultural, and educational needs considered and patient agreeable to plan of care and goals.     Education  Education was done with Patient. The patient's learning style includes Listening and Pictures/video. The patient Verbalizes understanding.             Abdominal Bracing With Pelvic Floor (Hook-Lying)        With neutral spine, tighten pelvic floor (as if you are holding back or stopping urine) and abdominals. Hold for 5 seconds. Relax for 10 seconds. Repeat _10__ times. Do 1-2___ times a day.      Copyright © [employer]. All rights reserved.      Isometric Hold With Pelvic Floor (Sitting)        Sit upright. Slowly inhale, and then exhale. Pull navel toward spine and tighten pelvic floor. Hold for _5__ seconds. Continue to breathe in and out during hold. Rest for 10___ seconds.  Repeat _10__ times. Do __1-2_ times a day.    Copyright © [employer]. All rights reserved.      Isometric Hold With Pelvic Floor (Standing)        Standing erect, slowly inhale, and then exhale. Pull navel toward spine and tighten pelvic floor. Hold for _5__ seconds. Continue to breathe in and out during hold. Rest for _10__ seconds.  Repeat 10___ times. Do 1-2___ times a day.    Copyright © [employer]. All rights reserved.      Sit to Stand:        Sitting, squeeze pelvic floor and hold. Lean trunk forward. Push up on arms and stand up. Relax.  Repeat __5_ times. Do 1-2___ times a day.    Copyright © [employer]. All rights reserved.     Goals:   Active        Physical Therapy       Physical Therapy Goal       Start:  02/21/25    Expected End:  05/21/25                Estee Penny, PT

## 2025-02-21 NOTE — PATIENT INSTRUCTIONS
Abdominal Bracing With Pelvic Floor (Hook-Lying)        With neutral spine, tighten pelvic floor (as if you are holding back or stopping urine) and abdominals. Hold for 5 seconds. Relax for 10 seconds. Repeat _10__ times. Do 1-2___ times a day.      Copyright © I. All rights reserved.      Isometric Hold With Pelvic Floor (Sitting)        Sit upright. Slowly inhale, and then exhale. Pull navel toward spine and tighten pelvic floor. Hold for _5__ seconds. Continue to breathe in and out during hold. Rest for 10___ seconds.  Repeat _10__ times. Do __1-2_ times a day.    Copyright © I. All rights reserved.      Isometric Hold With Pelvic Floor (Standing)        Standing erect, slowly inhale, and then exhale. Pull navel toward spine and tighten pelvic floor. Hold for _5__ seconds. Continue to breathe in and out during hold. Rest for _10__ seconds.  Repeat 10___ times. Do 1-2___ times a day.    Copyright © I. All rights reserved.      Sit to Stand:        Sitting, squeeze pelvic floor and hold. Lean trunk forward. Push up on arms and stand up. Relax.  Repeat __5_ times. Do 1-2___ times a day.    Copyright © I. All rights reserved.

## 2025-08-08 ENCOUNTER — TELEPHONE (OUTPATIENT)
Dept: OBSTETRICS AND GYNECOLOGY | Facility: CLINIC | Age: 35
End: 2025-08-08

## 2025-08-08 NOTE — TELEPHONE ENCOUNTER
Has been doing counseling.  Was told she could be having PPA.  Thinks it has gotten better but there are times she gets overwhelmed.  Wanting to discuss PRN anxiety medication.  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  Scheduled with Dr. Lomas.